# Patient Record
Sex: MALE | Race: WHITE | NOT HISPANIC OR LATINO | Employment: FULL TIME | URBAN - METROPOLITAN AREA
[De-identification: names, ages, dates, MRNs, and addresses within clinical notes are randomized per-mention and may not be internally consistent; named-entity substitution may affect disease eponyms.]

---

## 2017-05-21 ENCOUNTER — VIRTUAL VISIT (OUTPATIENT)
Dept: FAMILY MEDICINE | Facility: OTHER | Age: 52
End: 2017-05-21

## 2017-05-21 NOTE — PROGRESS NOTES
"Date:   Clinician: Angelica Langley  Clinician NPI: [id number]  Patient: Simone Dill  Patient : 1965  Patient Address: [address] [place] [address]  Patient Phone: [phone]  Visit Protocol: URI  Patient Summary:  Simone is a 51 year old ( : 1965 ) male who initiated a Visit for a presumed sinus infection. When asked the question \"Please sign me up to receive news, health information and promotions. \", Simone responded \"No\".     His  symptoms started gradually 10-13 days ago  and consist of rhinitis, hoarse voice, nasal congestion, post-nasal drainage, malaise, chills, loss of appetite, itchy eyes, myalgias, and ear pain.   He denies fever, dysphagia, cough, chest pain, sore throat, dyspnea, nausea, and vomiting. He denies a history of facial surgery.   His moderate nasal secretions are yellow. His moderate facial pain or pressure feels worse when bending over or leaning forward and is located on both sides of his head. The facial pain or pressure started after the onset of other URI symptoms.  Simone has a moderate headache. The headache did not start before his other symptoms and is located on both sides of his head.   In the past year Simone has been diagnosed with two (2) episodes of sinusitis. When asked to feel his neck he could not tell if lymph nodes were enlarged. He denies axillary lymphadenopathy.   Regarding the ear pain, the patient denies tinnitus, recent injury to the area around the ear, and experiencing pain when gently pulling on the earlobe.   He reports having moderate ear pain on the ear canal area of the right ear for 5-7 days. The ear pain has caused a slight decrease in hearing.   In addition to the ear pain, Simone also reports having the following symptoms:    A feeling of fullness in the ear as if it is clogged   Simone denies having swelling, tenderness, and redness on his ear. Simone has thick, yellow fluid leaking from the ear. The ear pain " began before the fluid started leaking and the pain stayed the same once fluid began leaking from the ear. The fluid has a bad oder.   Additionally, he finds the pain worsens if the mouth is open fully or the teeth are clenched, does not experience pain when bending the chin to the chest, and finds the pain is worse while eating or chewing.   He has had tympanostomy tube placement. But it is no longer in place.    He has passed urine in the past 12 hours. He denies rigors.   Simone denies having COPD or other chronic lung disease.   Pulse: self-reported pulse rate as: 12 beats in 10 seconds.    Weight (in lbs): 215   Simone does not smoke or use smokeless tobacco.  MEDICATIONS:  Mometasone (Nasonex), phenylephrine (Sudafed PE), diphenhydramine (Benadryl), phenylephrine (Arthur-Synephrine), ibuprofen (Advil, Motrin), fexofenadine (Allegra), and rosuvastatin (Crestor)  , ALLERGIES:    Patient free text response:   None    Clinician Response:  Dear Simone,  Based on the information you have provided, I am unable to diagnose and treat you without additional information. Please be seen in a clinic or urgent care to determine the treatment that is best for you. You will not be charged for this Visit. Thanks for using [employer].   Diagnosis: Unable to diagnose  Diagnosis ICD: R69  Additional Clinician Notes: You need to have your ear assessed for adequate treatment of this as well as you sinus issues. We cannot treat you through zipnosis and you will not be charged for this visit

## 2018-09-13 RX ORDER — ATENOLOL AND CHLORTHALIDONE TABLET 50; 25 MG/1; MG/1
1 TABLET ORAL DAILY
COMMUNITY

## 2018-09-13 RX ORDER — FEXOFENADINE HCL AND PSEUDOEPHEDRINE HCI 60; 120 MG/1; MG/1
1 TABLET, EXTENDED RELEASE ORAL DAILY
COMMUNITY

## 2018-09-18 ENCOUNTER — APPOINTMENT (OUTPATIENT)
Dept: GENERAL RADIOLOGY | Facility: CLINIC | Age: 53
DRG: 518 | End: 2018-09-18
Attending: ORTHOPAEDIC SURGERY
Payer: COMMERCIAL

## 2018-09-18 ENCOUNTER — HOSPITAL ENCOUNTER (INPATIENT)
Facility: CLINIC | Age: 53
LOS: 2 days | Discharge: HOME | DRG: 518 | End: 2018-09-20
Attending: ORTHOPAEDIC SURGERY | Admitting: ORTHOPAEDIC SURGERY
Payer: COMMERCIAL

## 2018-09-18 ENCOUNTER — ANESTHESIA (OUTPATIENT)
Dept: SURGERY | Facility: CLINIC | Age: 53
DRG: 518 | End: 2018-09-18
Payer: COMMERCIAL

## 2018-09-18 ENCOUNTER — ANESTHESIA EVENT (OUTPATIENT)
Dept: SURGERY | Facility: CLINIC | Age: 53
DRG: 518 | End: 2018-09-18
Payer: COMMERCIAL

## 2018-09-18 DIAGNOSIS — G95.9 CERVICAL MYELOPATHY (H): Primary | ICD-10-CM

## 2018-09-18 LAB
ABO + RH BLD: NORMAL
ABO + RH BLD: NORMAL
BLD GP AB SCN SERPL QL: NORMAL
BLOOD BANK CMNT PATIENT-IMP: NORMAL
CREAT SERPL-MCNC: 1.03 MG/DL (ref 0.66–1.25)
GFR SERPL CREATININE-BSD FRML MDRD: 76 ML/MIN/1.7M2
POTASSIUM SERPL-SCNC: 3.3 MMOL/L (ref 3.4–5.3)
SPECIMEN EXP DATE BLD: NORMAL

## 2018-09-18 PROCEDURE — 37000009 ZZH ANESTHESIA TECHNICAL FEE, EACH ADDTL 15 MIN: Performed by: ORTHOPAEDIC SURGERY

## 2018-09-18 PROCEDURE — 25000132 ZZH RX MED GY IP 250 OP 250 PS 637: Performed by: ANESTHESIOLOGY

## 2018-09-18 PROCEDURE — 40000985 XR CERVICAL SPINE PORT 1 VW

## 2018-09-18 PROCEDURE — 71000012 ZZH RECOVERY PHASE 1 LEVEL 1 FIRST HR: Performed by: ORTHOPAEDIC SURGERY

## 2018-09-18 PROCEDURE — 99207 ZZC NON-BILLABLE SERV PER CHARTING: CPT | Performed by: PHYSICIAN ASSISTANT

## 2018-09-18 PROCEDURE — 71000013 ZZH RECOVERY PHASE 1 LEVEL 1 EA ADDTL HR: Performed by: ORTHOPAEDIC SURGERY

## 2018-09-18 PROCEDURE — 0RR30JZ REPLACEMENT OF CERVICAL VERTEBRAL DISC WITH SYNTHETIC SUBSTITUTE, OPEN APPROACH: ICD-10-PCS | Performed by: ORTHOPAEDIC SURGERY

## 2018-09-18 PROCEDURE — 40000169 ZZH STATISTIC PRE-PROCEDURE ASSESSMENT I: Performed by: ORTHOPAEDIC SURGERY

## 2018-09-18 PROCEDURE — 25000128 H RX IP 250 OP 636: Performed by: PHYSICIAN ASSISTANT

## 2018-09-18 PROCEDURE — 37000008 ZZH ANESTHESIA TECHNICAL FEE, 1ST 30 MIN: Performed by: ORTHOPAEDIC SURGERY

## 2018-09-18 PROCEDURE — 00NW0ZZ RELEASE CERVICAL SPINAL CORD, OPEN APPROACH: ICD-10-PCS | Performed by: ORTHOPAEDIC SURGERY

## 2018-09-18 PROCEDURE — 86850 RBC ANTIBODY SCREEN: CPT | Performed by: ORTHOPAEDIC SURGERY

## 2018-09-18 PROCEDURE — 25000125 ZZHC RX 250: Performed by: ORTHOPAEDIC SURGERY

## 2018-09-18 PROCEDURE — L8699 PROSTHETIC IMPLANT NOS: HCPCS | Performed by: ORTHOPAEDIC SURGERY

## 2018-09-18 PROCEDURE — 25000128 H RX IP 250 OP 636: Performed by: ANESTHESIOLOGY

## 2018-09-18 PROCEDURE — 25000128 H RX IP 250 OP 636: Performed by: ORTHOPAEDIC SURGERY

## 2018-09-18 PROCEDURE — 25000132 ZZH RX MED GY IP 250 OP 250 PS 637: Performed by: ORTHOPAEDIC SURGERY

## 2018-09-18 PROCEDURE — 4A11X4G MONITORING OF PERIPHERAL NERVOUS ELECTRICAL ACTIVITY, INTRAOPERATIVE, EXTERNAL APPROACH: ICD-10-PCS | Performed by: ORTHOPAEDIC SURGERY

## 2018-09-18 PROCEDURE — 86900 BLOOD TYPING SEROLOGIC ABO: CPT | Performed by: ORTHOPAEDIC SURGERY

## 2018-09-18 PROCEDURE — 27210794 ZZH OR GENERAL SUPPLY STERILE: Performed by: ORTHOPAEDIC SURGERY

## 2018-09-18 PROCEDURE — 95940 IONM IN OPERATNG ROOM 15 MIN: CPT | Performed by: ORTHOPAEDIC SURGERY

## 2018-09-18 PROCEDURE — 36000069 ZZH SURGERY LEVEL 5 EA 15 ADDTL MIN: Performed by: ORTHOPAEDIC SURGERY

## 2018-09-18 PROCEDURE — 40000986 XR CERVICAL SPINE PORT 2/3 VW

## 2018-09-18 PROCEDURE — 12000000 ZZH R&B MED SURG/OB

## 2018-09-18 PROCEDURE — 25000566 ZZH SEVOFLURANE, EA 15 MIN: Performed by: ORTHOPAEDIC SURGERY

## 2018-09-18 PROCEDURE — 36000067 ZZH SURGERY LEVEL 5 1ST 30 MIN: Performed by: ORTHOPAEDIC SURGERY

## 2018-09-18 PROCEDURE — 82565 ASSAY OF CREATININE: CPT | Performed by: ANESTHESIOLOGY

## 2018-09-18 PROCEDURE — 86901 BLOOD TYPING SEROLOGIC RH(D): CPT | Performed by: ORTHOPAEDIC SURGERY

## 2018-09-18 PROCEDURE — C1713 ANCHOR/SCREW BN/BN,TIS/BN: HCPCS | Performed by: ORTHOPAEDIC SURGERY

## 2018-09-18 PROCEDURE — 84132 ASSAY OF SERUM POTASSIUM: CPT | Performed by: ANESTHESIOLOGY

## 2018-09-18 PROCEDURE — 0RT30ZZ RESECTION OF CERVICAL VERTEBRAL DISC, OPEN APPROACH: ICD-10-PCS | Performed by: ORTHOPAEDIC SURGERY

## 2018-09-18 PROCEDURE — 40000277 XR SURGERY CARM FLUORO LESS THAN 5 MIN W STILLS

## 2018-09-18 DEVICE — IMP SCR SYN CORTEX RECESS 2.0X06MM ST TI 401.136.99: Type: IMPLANTABLE DEVICE | Site: SPINE CERVICAL | Status: FUNCTIONAL

## 2018-09-18 DEVICE — IMPLANTABLE DEVICE: Type: IMPLANTABLE DEVICE | Site: SPINE CERVICAL | Status: FUNCTIONAL

## 2018-09-18 DEVICE — IMP PLATE SYN ADAPTION 2.0X100MM 20H TI 447.10: Type: IMPLANTABLE DEVICE | Site: SPINE CERVICAL | Status: FUNCTIONAL

## 2018-09-18 RX ORDER — CEFAZOLIN SODIUM 2 G/100ML
2 INJECTION, SOLUTION INTRAVENOUS
Status: COMPLETED | OUTPATIENT
Start: 2018-09-18 | End: 2018-09-18

## 2018-09-18 RX ORDER — HYDROMORPHONE HYDROCHLORIDE 1 MG/ML
.3-.5 INJECTION, SOLUTION INTRAMUSCULAR; INTRAVENOUS; SUBCUTANEOUS
Status: DISCONTINUED | OUTPATIENT
Start: 2018-09-18 | End: 2018-09-20 | Stop reason: HOSPADM

## 2018-09-18 RX ORDER — FENTANYL CITRATE 50 UG/ML
INJECTION, SOLUTION INTRAMUSCULAR; INTRAVENOUS PRN
Status: DISCONTINUED | OUTPATIENT
Start: 2018-09-18 | End: 2018-09-18

## 2018-09-18 RX ORDER — METOCLOPRAMIDE 10 MG/1
10 TABLET ORAL EVERY 6 HOURS PRN
Status: DISCONTINUED | OUTPATIENT
Start: 2018-09-18 | End: 2018-09-20 | Stop reason: HOSPADM

## 2018-09-18 RX ORDER — OXYCODONE HCL 10 MG/1
10 TABLET, FILM COATED, EXTENDED RELEASE ORAL EVERY 12 HOURS
Status: DISCONTINUED | OUTPATIENT
Start: 2018-09-18 | End: 2018-09-18 | Stop reason: HOSPADM

## 2018-09-18 RX ORDER — PROPOFOL 10 MG/ML
INJECTION, EMULSION INTRAVENOUS CONTINUOUS PRN
Status: DISCONTINUED | OUTPATIENT
Start: 2018-09-18 | End: 2018-09-18

## 2018-09-18 RX ORDER — ATENOLOL 50 MG/1
50 TABLET ORAL DAILY
Status: DISCONTINUED | OUTPATIENT
Start: 2018-09-19 | End: 2018-09-20 | Stop reason: HOSPADM

## 2018-09-18 RX ORDER — DIAZEPAM 5 MG
5 TABLET ORAL EVERY 8 HOURS PRN
Status: DISCONTINUED | OUTPATIENT
Start: 2018-09-18 | End: 2018-09-20 | Stop reason: HOSPADM

## 2018-09-18 RX ORDER — EPHEDRINE SULFATE 50 MG/ML
INJECTION, SOLUTION INTRAMUSCULAR; INTRAVENOUS; SUBCUTANEOUS PRN
Status: DISCONTINUED | OUTPATIENT
Start: 2018-09-18 | End: 2018-09-18

## 2018-09-18 RX ORDER — ACETAMINOPHEN 325 MG/1
650 TABLET ORAL EVERY 4 HOURS PRN
Status: DISCONTINUED | OUTPATIENT
Start: 2018-09-21 | End: 2018-09-20 | Stop reason: HOSPADM

## 2018-09-18 RX ORDER — HYDRALAZINE HYDROCHLORIDE 20 MG/ML
2.5-5 INJECTION INTRAMUSCULAR; INTRAVENOUS EVERY 10 MIN PRN
Status: DISCONTINUED | OUTPATIENT
Start: 2018-09-18 | End: 2018-09-18 | Stop reason: HOSPADM

## 2018-09-18 RX ORDER — OXYCODONE HYDROCHLORIDE 5 MG/1
5-10 TABLET ORAL
Status: DISCONTINUED | OUTPATIENT
Start: 2018-09-18 | End: 2018-09-20 | Stop reason: HOSPADM

## 2018-09-18 RX ORDER — MULTIPLE VITAMINS W/ MINERALS TAB 9MG-400MCG
1 TAB ORAL DAILY
COMMUNITY

## 2018-09-18 RX ORDER — NITROGLYCERIN 20 MG/100ML
0.07-2 INJECTION INTRAVENOUS CONTINUOUS
Status: DISCONTINUED | OUTPATIENT
Start: 2018-09-18 | End: 2018-09-18 | Stop reason: HOSPADM

## 2018-09-18 RX ORDER — SODIUM CHLORIDE, SODIUM LACTATE, POTASSIUM CHLORIDE, CALCIUM CHLORIDE 600; 310; 30; 20 MG/100ML; MG/100ML; MG/100ML; MG/100ML
INJECTION, SOLUTION INTRAVENOUS CONTINUOUS PRN
Status: DISCONTINUED | OUTPATIENT
Start: 2018-09-18 | End: 2018-09-18

## 2018-09-18 RX ORDER — ACETAMINOPHEN 500 MG
1000 TABLET ORAL ONCE
Status: COMPLETED | OUTPATIENT
Start: 2018-09-18 | End: 2018-09-18

## 2018-09-18 RX ORDER — ASCORBIC ACID 500 MG
1500 TABLET ORAL DAILY
Status: DISCONTINUED | OUTPATIENT
Start: 2018-09-18 | End: 2018-09-20 | Stop reason: HOSPADM

## 2018-09-18 RX ORDER — NALOXONE HYDROCHLORIDE 0.4 MG/ML
.1-.4 INJECTION, SOLUTION INTRAMUSCULAR; INTRAVENOUS; SUBCUTANEOUS
Status: DISCONTINUED | OUTPATIENT
Start: 2018-09-18 | End: 2018-09-18 | Stop reason: HOSPADM

## 2018-09-18 RX ORDER — ATENOLOL AND CHLORTHALIDONE TABLET 50; 25 MG/1; MG/1
1 TABLET ORAL DAILY
Status: DISCONTINUED | OUTPATIENT
Start: 2018-09-19 | End: 2018-09-18

## 2018-09-18 RX ORDER — ONDANSETRON 2 MG/ML
4 INJECTION INTRAMUSCULAR; INTRAVENOUS EVERY 30 MIN PRN
Status: DISCONTINUED | OUTPATIENT
Start: 2018-09-18 | End: 2018-09-18 | Stop reason: HOSPADM

## 2018-09-18 RX ORDER — ONDANSETRON 4 MG/1
4 TABLET, ORALLY DISINTEGRATING ORAL EVERY 30 MIN PRN
Status: DISCONTINUED | OUTPATIENT
Start: 2018-09-18 | End: 2018-09-18 | Stop reason: HOSPADM

## 2018-09-18 RX ORDER — PRAMIPEXOLE DIHYDROCHLORIDE 0.25 MG/1
0.25 TABLET ORAL AT BEDTIME
Status: DISCONTINUED | OUTPATIENT
Start: 2018-09-18 | End: 2018-09-20 | Stop reason: HOSPADM

## 2018-09-18 RX ORDER — ONDANSETRON 2 MG/ML
INJECTION INTRAMUSCULAR; INTRAVENOUS PRN
Status: DISCONTINUED | OUTPATIENT
Start: 2018-09-18 | End: 2018-09-18

## 2018-09-18 RX ORDER — TESTOSTERONE CYPIONATE 200 MG/ML
50 INJECTION, SOLUTION INTRAMUSCULAR
COMMUNITY

## 2018-09-18 RX ORDER — INDOMETHACIN 50 MG/1
50 CAPSULE ORAL
Status: DISCONTINUED | OUTPATIENT
Start: 2018-09-18 | End: 2018-09-20 | Stop reason: HOSPADM

## 2018-09-18 RX ORDER — AMOXICILLIN 250 MG
2 CAPSULE ORAL 2 TIMES DAILY
Status: DISCONTINUED | OUTPATIENT
Start: 2018-09-18 | End: 2018-09-20 | Stop reason: HOSPADM

## 2018-09-18 RX ORDER — SODIUM CHLORIDE, SODIUM LACTATE, POTASSIUM CHLORIDE, CALCIUM CHLORIDE 600; 310; 30; 20 MG/100ML; MG/100ML; MG/100ML; MG/100ML
INJECTION, SOLUTION INTRAVENOUS CONTINUOUS
Status: DISCONTINUED | OUTPATIENT
Start: 2018-09-18 | End: 2018-09-18 | Stop reason: HOSPADM

## 2018-09-18 RX ORDER — NALOXONE HYDROCHLORIDE 0.4 MG/ML
.1-.4 INJECTION, SOLUTION INTRAMUSCULAR; INTRAVENOUS; SUBCUTANEOUS
Status: DISCONTINUED | OUTPATIENT
Start: 2018-09-18 | End: 2018-09-20 | Stop reason: HOSPADM

## 2018-09-18 RX ORDER — BISACODYL 10 MG
10 SUPPOSITORY, RECTAL RECTAL DAILY PRN
Status: DISCONTINUED | OUTPATIENT
Start: 2018-09-18 | End: 2018-09-20 | Stop reason: HOSPADM

## 2018-09-18 RX ORDER — ONDANSETRON 4 MG/1
4 TABLET, ORALLY DISINTEGRATING ORAL EVERY 6 HOURS PRN
Status: DISCONTINUED | OUTPATIENT
Start: 2018-09-18 | End: 2018-09-20 | Stop reason: HOSPADM

## 2018-09-18 RX ORDER — LIDOCAINE 40 MG/G
CREAM TOPICAL
Status: DISCONTINUED | OUTPATIENT
Start: 2018-09-18 | End: 2018-09-20 | Stop reason: HOSPADM

## 2018-09-18 RX ORDER — SODIUM CHLORIDE AND POTASSIUM CHLORIDE 150; 900 MG/100ML; MG/100ML
INJECTION, SOLUTION INTRAVENOUS CONTINUOUS
Status: DISCONTINUED | OUTPATIENT
Start: 2018-09-18 | End: 2018-09-20 | Stop reason: HOSPADM

## 2018-09-18 RX ORDER — HYDRALAZINE HYDROCHLORIDE 20 MG/ML
10 INJECTION INTRAMUSCULAR; INTRAVENOUS EVERY 4 HOURS PRN
Status: DISCONTINUED | OUTPATIENT
Start: 2018-09-18 | End: 2018-09-20 | Stop reason: HOSPADM

## 2018-09-18 RX ORDER — BUPIVACAINE HYDROCHLORIDE AND EPINEPHRINE 2.5; 5 MG/ML; UG/ML
INJECTION, SOLUTION INFILTRATION; PERINEURAL PRN
Status: DISCONTINUED | OUTPATIENT
Start: 2018-09-18 | End: 2018-09-18 | Stop reason: HOSPADM

## 2018-09-18 RX ORDER — MOMETASONE FUROATE MONOHYDRATE 50 UG/1
2 SPRAY, METERED NASAL AT BEDTIME
COMMUNITY

## 2018-09-18 RX ORDER — LIDOCAINE HYDROCHLORIDE 20 MG/ML
INJECTION, SOLUTION INFILTRATION; PERINEURAL PRN
Status: DISCONTINUED | OUTPATIENT
Start: 2018-09-18 | End: 2018-09-18

## 2018-09-18 RX ORDER — AMOXICILLIN 250 MG
1 CAPSULE ORAL 2 TIMES DAILY
Status: DISCONTINUED | OUTPATIENT
Start: 2018-09-18 | End: 2018-09-20 | Stop reason: HOSPADM

## 2018-09-18 RX ORDER — FEXOFENADINE HCL AND PSEUDOEPHEDRINE HCI 60; 120 MG/1; MG/1
1 TABLET, EXTENDED RELEASE ORAL DAILY
Status: DISCONTINUED | OUTPATIENT
Start: 2018-09-18 | End: 2018-09-20 | Stop reason: HOSPADM

## 2018-09-18 RX ORDER — HYDROMORPHONE HYDROCHLORIDE 1 MG/ML
.3-.5 INJECTION, SOLUTION INTRAMUSCULAR; INTRAVENOUS; SUBCUTANEOUS EVERY 5 MIN PRN
Status: DISCONTINUED | OUTPATIENT
Start: 2018-09-18 | End: 2018-09-18 | Stop reason: HOSPADM

## 2018-09-18 RX ORDER — GABAPENTIN 300 MG/1
300 CAPSULE ORAL 3 TIMES DAILY
Status: DISCONTINUED | OUTPATIENT
Start: 2018-09-18 | End: 2018-09-20 | Stop reason: HOSPADM

## 2018-09-18 RX ORDER — POVIDONE-IODINE 10 MG/G
OINTMENT TOPICAL PRN
Status: DISCONTINUED | OUTPATIENT
Start: 2018-09-18 | End: 2018-09-18 | Stop reason: HOSPADM

## 2018-09-18 RX ORDER — UBIDECARENONE 75 MG
100 CAPSULE ORAL DAILY
Status: DISCONTINUED | OUTPATIENT
Start: 2018-09-19 | End: 2018-09-20 | Stop reason: HOSPADM

## 2018-09-18 RX ORDER — METOCLOPRAMIDE HYDROCHLORIDE 5 MG/ML
10 INJECTION INTRAMUSCULAR; INTRAVENOUS EVERY 6 HOURS PRN
Status: DISCONTINUED | OUTPATIENT
Start: 2018-09-18 | End: 2018-09-20 | Stop reason: HOSPADM

## 2018-09-18 RX ORDER — ROSUVASTATIN CALCIUM 20 MG/1
40 TABLET, COATED ORAL AT BEDTIME
Status: DISCONTINUED | OUTPATIENT
Start: 2018-09-18 | End: 2018-09-20 | Stop reason: HOSPADM

## 2018-09-18 RX ORDER — ONDANSETRON 2 MG/ML
4 INJECTION INTRAMUSCULAR; INTRAVENOUS EVERY 6 HOURS PRN
Status: DISCONTINUED | OUTPATIENT
Start: 2018-09-18 | End: 2018-09-20 | Stop reason: HOSPADM

## 2018-09-18 RX ORDER — LABETALOL HYDROCHLORIDE 5 MG/ML
10 INJECTION, SOLUTION INTRAVENOUS EVERY 4 HOURS PRN
Status: DISCONTINUED | OUTPATIENT
Start: 2018-09-18 | End: 2018-09-20 | Stop reason: HOSPADM

## 2018-09-18 RX ORDER — CHLORTHALIDONE 25 MG/1
25 TABLET ORAL DAILY
Status: DISCONTINUED | OUTPATIENT
Start: 2018-09-19 | End: 2018-09-20 | Stop reason: HOSPADM

## 2018-09-18 RX ORDER — FENTANYL CITRATE 50 UG/ML
25-50 INJECTION, SOLUTION INTRAMUSCULAR; INTRAVENOUS
Status: DISCONTINUED | OUTPATIENT
Start: 2018-09-18 | End: 2018-09-18 | Stop reason: HOSPADM

## 2018-09-18 RX ORDER — ACETAMINOPHEN 325 MG/1
975 TABLET ORAL EVERY 8 HOURS
Status: DISCONTINUED | OUTPATIENT
Start: 2018-09-18 | End: 2018-09-20 | Stop reason: HOSPADM

## 2018-09-18 RX ORDER — CEFAZOLIN SODIUM 1 G/3ML
1 INJECTION, POWDER, FOR SOLUTION INTRAMUSCULAR; INTRAVENOUS EVERY 8 HOURS
Status: COMPLETED | OUTPATIENT
Start: 2018-09-18 | End: 2018-09-19

## 2018-09-18 RX ORDER — CEFAZOLIN SODIUM 1 G/3ML
1 INJECTION, POWDER, FOR SOLUTION INTRAMUSCULAR; INTRAVENOUS SEE ADMIN INSTRUCTIONS
Status: DISCONTINUED | OUTPATIENT
Start: 2018-09-18 | End: 2018-09-18 | Stop reason: HOSPADM

## 2018-09-18 RX ORDER — OXYCODONE HCL 10 MG/1
10 TABLET, FILM COATED, EXTENDED RELEASE ORAL EVERY 12 HOURS
Status: COMPLETED | OUTPATIENT
Start: 2018-09-18 | End: 2018-09-20

## 2018-09-18 RX ORDER — PROPOFOL 10 MG/ML
INJECTION, EMULSION INTRAVENOUS PRN
Status: DISCONTINUED | OUTPATIENT
Start: 2018-09-18 | End: 2018-09-18

## 2018-09-18 RX ORDER — ESCITALOPRAM OXALATE 10 MG/1
10 TABLET ORAL DAILY
Status: DISCONTINUED | OUTPATIENT
Start: 2018-09-19 | End: 2018-09-20 | Stop reason: HOSPADM

## 2018-09-18 RX ADMIN — HYDRALAZINE HYDROCHLORIDE 10 MG: 20 INJECTION INTRAMUSCULAR; INTRAVENOUS at 19:52

## 2018-09-18 RX ADMIN — PROPOFOL 200 MG: 10 INJECTION, EMULSION INTRAVENOUS at 08:17

## 2018-09-18 RX ADMIN — EPHEDRINE SULFATE 5 MG: 50 INJECTION, SOLUTION INTRAMUSCULAR; INTRAVENOUS; SUBCUTANEOUS at 09:02

## 2018-09-18 RX ADMIN — OXYCODONE HYDROCHLORIDE 5 MG: 5 TABLET ORAL at 18:35

## 2018-09-18 RX ADMIN — SENNOSIDES AND DOCUSATE SODIUM 2 TABLET: 8.6; 5 TABLET ORAL at 20:21

## 2018-09-18 RX ADMIN — FENTANYL CITRATE 50 MCG: 50 INJECTION, SOLUTION INTRAMUSCULAR; INTRAVENOUS at 10:58

## 2018-09-18 RX ADMIN — EPHEDRINE SULFATE 7.5 MG: 50 INJECTION, SOLUTION INTRAMUSCULAR; INTRAVENOUS; SUBCUTANEOUS at 08:32

## 2018-09-18 RX ADMIN — PROPOFOL 100 MCG/KG/MIN: 10 INJECTION, EMULSION INTRAVENOUS at 08:20

## 2018-09-18 RX ADMIN — Medication 0.5 MG: at 16:15

## 2018-09-18 RX ADMIN — SODIUM CHLORIDE, SODIUM LACTATE, POTASSIUM CHLORIDE, CALCIUM CHLORIDE: 600; 310; 30; 20 INJECTION, SOLUTION INTRAVENOUS at 08:00

## 2018-09-18 RX ADMIN — FENTANYL CITRATE 50 MCG: 50 INJECTION INTRAMUSCULAR; INTRAVENOUS at 16:00

## 2018-09-18 RX ADMIN — LIDOCAINE HYDROCHLORIDE 100 MG: 20 INJECTION, SOLUTION INFILTRATION; PERINEURAL at 08:17

## 2018-09-18 RX ADMIN — EPHEDRINE SULFATE 5 MG: 50 INJECTION, SOLUTION INTRAMUSCULAR; INTRAVENOUS; SUBCUTANEOUS at 11:00

## 2018-09-18 RX ADMIN — FENTANYL CITRATE 50 MCG: 50 INJECTION INTRAMUSCULAR; INTRAVENOUS at 16:55

## 2018-09-18 RX ADMIN — FENTANYL CITRATE 50 MCG: 50 INJECTION, SOLUTION INTRAMUSCULAR; INTRAVENOUS at 15:19

## 2018-09-18 RX ADMIN — SODIUM CHLORIDE, POTASSIUM CHLORIDE, SODIUM LACTATE AND CALCIUM CHLORIDE: 600; 310; 30; 20 INJECTION, SOLUTION INTRAVENOUS at 07:29

## 2018-09-18 RX ADMIN — OXYCODONE HYDROCHLORIDE 10 MG: 10 TABLET, FILM COATED, EXTENDED RELEASE ORAL at 19:38

## 2018-09-18 RX ADMIN — SODIUM CHLORIDE, SODIUM LACTATE, POTASSIUM CHLORIDE, CALCIUM CHLORIDE: 600; 310; 30; 20 INJECTION, SOLUTION INTRAVENOUS at 11:10

## 2018-09-18 RX ADMIN — FEXOFENADINE HYDROCHLORIDE AND PSEUDOEPHEDRINE HYDROCHLORIDE 1 TABLET: 60; 120 TABLET, FILM COATED, EXTENDED RELEASE ORAL at 19:37

## 2018-09-18 RX ADMIN — SODIUM CHLORIDE, SODIUM LACTATE, POTASSIUM CHLORIDE, CALCIUM CHLORIDE: 600; 310; 30; 20 INJECTION, SOLUTION INTRAVENOUS at 09:47

## 2018-09-18 RX ADMIN — OXYCODONE HYDROCHLORIDE AND ACETAMINOPHEN 1500 MG: 500 TABLET ORAL at 19:37

## 2018-09-18 RX ADMIN — Medication 0.5 MG: at 16:24

## 2018-09-18 RX ADMIN — FENTANYL CITRATE 50 MCG: 50 INJECTION, SOLUTION INTRAMUSCULAR; INTRAVENOUS at 15:03

## 2018-09-18 RX ADMIN — HYDRALAZINE HYDROCHLORIDE 2.5 MG: 20 INJECTION INTRAMUSCULAR; INTRAVENOUS at 16:36

## 2018-09-18 RX ADMIN — FENTANYL CITRATE 50 MCG: 50 INJECTION, SOLUTION INTRAMUSCULAR; INTRAVENOUS at 14:58

## 2018-09-18 RX ADMIN — Medication 20 MG: at 09:33

## 2018-09-18 RX ADMIN — FENTANYL CITRATE 50 MCG: 50 INJECTION, SOLUTION INTRAMUSCULAR; INTRAVENOUS at 15:33

## 2018-09-18 RX ADMIN — FENTANYL CITRATE 50 MCG: 50 INJECTION, SOLUTION INTRAMUSCULAR; INTRAVENOUS at 15:28

## 2018-09-18 RX ADMIN — ACETAMINOPHEN 975 MG: 325 TABLET, FILM COATED ORAL at 19:37

## 2018-09-18 RX ADMIN — ACETAMINOPHEN 1000 MG: 500 TABLET, FILM COATED ORAL at 07:28

## 2018-09-18 RX ADMIN — Medication 20 MG: at 09:24

## 2018-09-18 RX ADMIN — INDOMETHACIN 50 MG: 50 CAPSULE ORAL at 19:38

## 2018-09-18 RX ADMIN — FENTANYL CITRATE 50 MCG: 50 INJECTION INTRAMUSCULAR; INTRAVENOUS at 16:50

## 2018-09-18 RX ADMIN — SODIUM CHLORIDE AND POTASSIUM CHLORIDE: 9; 1.49 INJECTION, SOLUTION INTRAVENOUS at 18:35

## 2018-09-18 RX ADMIN — PRAMIPEXOLE DIHYDROCHLORIDE 0.25 MG: 0.25 TABLET ORAL at 21:50

## 2018-09-18 RX ADMIN — ROSUVASTATIN CALCIUM 40 MG: 20 TABLET, FILM COATED ORAL at 21:50

## 2018-09-18 RX ADMIN — EPHEDRINE SULFATE 5 MG: 50 INJECTION, SOLUTION INTRAMUSCULAR; INTRAVENOUS; SUBCUTANEOUS at 08:33

## 2018-09-18 RX ADMIN — FENTANYL CITRATE 50 MCG: 50 INJECTION, SOLUTION INTRAMUSCULAR; INTRAVENOUS at 09:34

## 2018-09-18 RX ADMIN — CEFAZOLIN SODIUM 2 G: 2 INJECTION, SOLUTION INTRAVENOUS at 09:00

## 2018-09-18 RX ADMIN — Medication 0.5 MG: at 16:51

## 2018-09-18 RX ADMIN — FENTANYL CITRATE 50 MCG: 50 INJECTION INTRAMUSCULAR; INTRAVENOUS at 16:07

## 2018-09-18 RX ADMIN — Medication 0.5 MG: at 16:30

## 2018-09-18 RX ADMIN — Medication 1 TABLET: at 20:21

## 2018-09-18 RX ADMIN — OXYCODONE HYDROCHLORIDE 10 MG: 10 TABLET, FILM COATED, EXTENDED RELEASE ORAL at 07:28

## 2018-09-18 RX ADMIN — FENTANYL CITRATE 100 MCG: 50 INJECTION, SOLUTION INTRAMUSCULAR; INTRAVENOUS at 08:15

## 2018-09-18 RX ADMIN — OXYCODONE HYDROCHLORIDE 10 MG: 5 TABLET ORAL at 22:44

## 2018-09-18 RX ADMIN — ONDANSETRON 4 MG: 2 INJECTION INTRAMUSCULAR; INTRAVENOUS at 14:39

## 2018-09-18 RX ADMIN — DIAZEPAM 5 MG: 5 TABLET ORAL at 20:38

## 2018-09-18 RX ADMIN — HYDRALAZINE HYDROCHLORIDE 2.5 MG: 20 INJECTION INTRAMUSCULAR; INTRAVENOUS at 16:54

## 2018-09-18 RX ADMIN — PROPOFOL 50 MG: 10 INJECTION, EMULSION INTRAVENOUS at 11:20

## 2018-09-18 RX ADMIN — EPHEDRINE SULFATE 5 MG: 50 INJECTION, SOLUTION INTRAMUSCULAR; INTRAVENOUS; SUBCUTANEOUS at 09:15

## 2018-09-18 RX ADMIN — SODIUM CHLORIDE, SODIUM LACTATE, POTASSIUM CHLORIDE, CALCIUM CHLORIDE: 600; 310; 30; 20 INJECTION, SOLUTION INTRAVENOUS at 08:25

## 2018-09-18 RX ADMIN — Medication 0.5 MG: at 14:59

## 2018-09-18 RX ADMIN — CEFAZOLIN SODIUM 2 G: 2 INJECTION, SOLUTION INTRAVENOUS at 13:00

## 2018-09-18 RX ADMIN — CEFAZOLIN 1 G: 1 INJECTION, POWDER, FOR SOLUTION INTRAMUSCULAR; INTRAVENOUS at 20:21

## 2018-09-18 RX ADMIN — GABAPENTIN 300 MG: 300 CAPSULE ORAL at 21:50

## 2018-09-18 RX ADMIN — SODIUM CHLORIDE, SODIUM LACTATE, POTASSIUM CHLORIDE, CALCIUM CHLORIDE: 600; 310; 30; 20 INJECTION, SOLUTION INTRAVENOUS at 09:25

## 2018-09-18 ASSESSMENT — VISUAL ACUITY
OU: NORMAL ACUITY

## 2018-09-18 ASSESSMENT — ACTIVITIES OF DAILY LIVING (ADL): ADLS_ACUITY_SCORE: 9

## 2018-09-18 NOTE — BRIEF OP NOTE
[facility] Brief Operative Note    Pre-operative diagnosis: CERVICAL SPINAL STENOSIS WITH MYELOPATHY, CENTRAL DISK EXTRUSION AT C5-6   Post-operative diagnosis same   Procedure: Procedure(s):  C5-6 ANTERIOR CERVICAL DISCECTOMY AND TOTAL DISK REPLACEMENT USING THE JIMMY DISK, POSTERIOR C3-6 LAMINOPLASTY WITH INSTRUMENTATION, FORAMINOTOMY C5 AND C6 ON RIGHT SIDE - Wound Class: I-Clean   - Wound Class: I-Clean   - Wound Class: I-Clean   Surgeon(s): Surgeon(s) and Role:  Panel 1:     * Carlos Jenkins MD - Primary     * Rowena Zamorano PA-C - Assisting    Panel 2:     * Carlos Jenkins MD - Primary     * Rowena Zamorano PA-C - Assisting   Estimated blood loss: 100 mL    Specimens: * No specimens in log *   Findings: Central disc herniation at C5-6, central stenosis C4 to C6, foraminal stenosis on the right at C5 and C6.

## 2018-09-18 NOTE — IP AVS SNAPSHOT
MRN:[id number]                      After Visit Summary   9/18/2018    Simone Dill    MRN: [id number]           Thank you!     Thank you for choosing [facility] for your care. Our goal is always to provide you with excellent care. Hearing back from our patients is one way we can continue to improve our services. Please take a few minutes to complete the written survey that you may receive in the mail after you visit with us. Thank you!        Patient Information     Date Of Birth          1965        Designated Caregiver       Most Recent Value    Caregiver    Will someone help with your care after discharge? yes    Name of designated caregiver Yaz    Phone number of caregiver [phone]    Caregiver address [address] [place] [address]      About your hospital stay     You were admitted on:  September 18, 2018 You last received care in the:  [facility] [address] [facility] [place]    You were discharged on:  September 20, 2018       Who to Call     For medical emergencies, please call 911.  For non-urgent questions about your medical care, please call your primary care provider or clinic, [phone]  For questions related to your surgery, please call your surgery clinic        Attending Provider     Provider Specialty    Carlos Jenkins MD Orthopedics       Primary Care Provider Office Phone # Fax #    Javier Leonard [phone]      After Care Instructions     Activity       Your activity upon discharge: activity as tolerated            Diet       Follow this diet upon discharge: Orders Placed This Encounter      Advance Diet as Tolerated: Regular Diet Adult; Dysphagia Diet Level 3: Advanced; Thin Liquids (water, ice chips, juice, milk gelatin, ice cream, etc)            Wound care and dressings       Instructions to care for your wound at home: as directed, ice to area for comfort and keep wound clean and dry.                   Follow-up Appointments     Follow-up and recommended labs and tests        Follow up with Dr. Sheridan , at (location with clinic name or city) [facility], within 10 days to evaluate after surgery. No follow up labs or test are needed.                  Further instructions from your care team       INSTRUCTIONS FOR CERVICAL SURGICAL PATIENTS  ROSS SHERIDAN MD--[facility]    POSTOPERATIVE INSTRUCTIONS (AFTER SURGERY):     PATIENTS WITH A SOFT COLLAR:    1. The collar must be worn at all times for the 6 weeks after your surgery until your first follow up appointment with Dr. Sheridan. You may only remove it to shower however, be sure to avoid any excessive movement of your head and neck.      PATIENTS WITH A CUSTOM NECK BRACE:  1. The brace must be worn at all times for 3 months after surgery. In most cases you can remove it to shower. Dr. Sheridan will tell you if you cannot.     2. Check the straps on the brace daily to make sure the lines drawn on the straps are where they belong and the brace is fitting properly.    3. Regarding any questions, concerns or needed adjustments with the brace please call [facility], [phone], and speak directly to the orthotist that fit the brace. If you are having difficulty reaching them please call [facility] at [phone].    4. This brace can be cumbersome to wear we encourage you to practice wearing it with all activities BEFORE your surgery. This will allow you to get used to how it will feel and make any modifications at home or work if need be.     INCISION/WOUND CARE                1.   If you are discharged from the hospital with a dressing over your incision you may remove and replace it 2 days after leaving the hospital.     2.   If you have sutures that dissolve, the incision must be covered when showering for 5 days after surgery. On the 6th day, you may take a shower normally without covering the wound. But do not scrub the  wound.     3.  The small pieces of tape over your incision are called Steri-strips, they can be   removed when they are loose or after 2 weeks.                                                                           4.    If you have staples, your incision must remain dry and covered until they are removed 2 weeks after your surgery. Please call Dr. Gregory Barron s Care Coordinator at [phone] to make an appointment to have these removed when you have returned home from the hospital.    5. Please look at your incisions daily and call [phone] immediately if you notice any redness, swelling, drainage, or if you develop a fever greater than 101. If after hours or weekends call [phone] and speak to the on-call physician.    6. Absolutely no bathtubs, hot tubs, swimming in pools or lakes, or any submersion/soaking before the incision is completely healed (no open areas or scabs are present).    7. Due to the location of the incision and surgery you may experience swelling that can alter your swallowing if you are having any difficulty at all please contact us immediately [phone] or after hours or weekends call [phone] and speak to the on-call physician.    POST OPERATIVE ACTIVITY LIMITATIONS    1. Avoid extreme motions of your head and neck which will be limited/controlled by the collar. Prior to surgery we encourage you to stock straws for drinking and soft foods to eat post-operatively as eating/drinking will be temporarily effected by both post surgical swelling and the collar.     2. No lifting over 10 pounds (gallon of milk) above your chest or below your waist.     3. Avoid repetitive twisting or bending i.e. raking, sweeping, shoveling etc.    4. Walking stimulates the healing process. Dr. Jenkins wants you to accomplish a minimum of 45 minutes of sustained walking per day for exercise. You are encouraged to walk several times a day and there is no limit on how far you can walk. In the  beginning you may only be able to walk 5-15 minutes at a time that is okay just do this a minimum of 4-10x/day.    5. You may remove your white stockings (joao hose) for   hour twice a day. You may discontinue wearing the stockings when you are not spending any time in bed during the day and are walking at least hourly.    6. You may begin driving again when you are no longer taking the narcotic pain medication and feel comfortable with being able to navigate amongst other drivers. You must also wear the collar that you have been provided while driving.    7. You may return to work when you are no longer taking the narcotic pain medication. You must observe the 10-pound lifting restriction (nothing below waist or above chest), frequent change of position from sit, stand, and walking and avoid repetitive bending and twisting. You must also wear the collar that you have been provided.    8. Advancement of physical activities will be discussed at each follow up appointment with Dr. Jenkins. Physical therapy, if needed, will also be discussed at each appointment.    9. Gentle sexual activity is okay. Be a passive participant.    POST OPERATIVE MEDICATIONS    1. If your surgery INCLUDED A FUSION  DO NOT take Ibuprofen, Motrin, Advil, Aleve or any other anti-inflammatory medication or aspirin products for 3 months after surgery. This also includes any medication taken for chronic inflammatory conditions i.e. Methotrexate, Remicaid, Prednisone etc. unless otherwise instructed. Only Tylenol or Tylenol based medications are okay during this time frame.    2. If your surgery DID NOT include a fusion you may resume any over-the-counter     anti-inflammatories (Advil, Ibuprofen, Naproxen etc.) 3-5 days after the surgery.    3. We recommend three 8 ounce glasses of milk daily and a daily supplement of Vitamin D 2000 i.u./day for fusion healing and bone growth.    4. Poor nutrition can lead to delayed wound healing and constipation.  "Good sources of lean proteins and fresh fruits and vegetables will help with this.     5. Narcotic pain medications will also cause constipation. Using over the counter stool softeners, drinking plenty of fluids, ambulation, and good nutrition can help prevent this from occurring.     If you have any questions regarding these instructions please contact Dr. Jenkins at   [phone].        Pending Results     No orders found from 2018 to 2018.            Statement of Approval     Ordered          18 1432  I have reviewed and agree with all the recommendations and orders detailed in this document.  EFFECTIVE NOW     Approved and electronically signed by:  Sil Koch PA-C             Admission Information     Date & Time Provider Department Dept. Phone    2018 Carlos Jenkins MD [address] [facility] [phone]      Your Vitals Were     Blood Pressure Temperature Respirations Height Weight Pulse Oximetry    122/79 (BP Location: Left arm) 97.7  F (36.5  C) (Oral) 20 1.93 m (6' 4\") 94.8 kg (208 lb 14.4 oz) 94%    BMI (Body Mass Index)                   25.43 kg/m2           [employer] Information     [employer] lets you send messages to your doctor, view your test results, renew your prescriptions, schedule appointments and more. To sign up, go to [email or web address] . Click on \"Log in\" on the left side of the screen, which will take you to the Welcome page. Then click on \"Sign up Now\" on the right side of the page.     You will be asked to enter the access code listed below, as well as some personal information. Please follow the directions to create your username and password.     Your access code is: [email or web address]  Expires: 2018  3:04 PM     Your access code will  in 90 days. If you need help or a new code, please call your [facility] or [phone].        Care EveryWhere ID     This is your Care EveryWhere ID. This could be used by other " organizations to access your [facility] medical records  [id number]        Equal Access to Services     RADHA RAMIREZ : Hadii chayo Abdul, carolyne butcher, jinny hinojosa. So [facility] [phone].    ATENCIÓN: Si habla español, tiene a roque disposición servicios gratuitos de asistencia lingüística. Llame al [phone].    We comply with applicable federal civil rights laws and [place] laws. We do not discriminate on the basis of race, color, national origin, age, disability, sex, sexual orientation, or gender identity.               Review of your medicines      START taking        Dose / Directions    * oxyCODONE IR 5 MG tablet   Commonly known as:  ROXICODONE        Dose:  5-10 mg   Take 1-2 tablets (5-10 mg) by mouth every 4 hours as needed for moderate to severe pain   Quantity:  40 tablet   Refills:  0       * oxyCODONE 10 MG 12 hr tablet   Commonly known as:  OxyCONTIN        Dose:  10 mg   Take 1 tablet (10 mg) by mouth every 12 hours maximum 2 tablet(s) per day   Quantity:  10 tablet   Refills:  0       senna-docusate 8.6-50 MG per tablet   Commonly known as:  SENOKOT-S;PERICOLACE        Dose:  2 tablet   Take 2 tablets by mouth 2 times daily   Quantity:  40 tablet   Refills:  0       * Notice:  This list has 2 medication(s) that are the same as other medications prescribed for you. Read the directions carefully, and ask your doctor or other care provider to review them with you.      CONTINUE these medicines which may have CHANGED, or have new prescriptions. If we are uncertain of the size of tablets/capsules you have at home, strength may be listed as something that might have changed.        Dose / Directions    diazepam 5 MG tablet   Commonly known as:  VALIUM   This may have changed:    - how much to take  - when to take this        Dose:  5 mg   Take 1 tablet (5 mg) by mouth every 8 hours as needed (spasm or tightness)   Quantity:  20  tablet   Refills:  0         CONTINUE these medicines which have NOT CHANGED        Dose / Directions    atenolol-chlorthalidone 50-25 MG per tablet   Commonly known as:  TENORETIC   Notes to Patient:  Resume home med schedule        Dose:  1 tablet   Take 1 tablet by mouth daily   Refills:  0       calcium carbonate 600 mg-vitamin D 400 units 600-400 MG-UNIT per tablet   Commonly known as:  CALTRATE        Dose:  1 tablet   Take 1 tablet by mouth 2 times daily   Refills:  0       CIALIS PO   Notes to Patient:  Resume home med schedule        Dose:  5 mg   Take 5 mg by mouth daily as needed for erectile dysfunction   Refills:  0       CRESTOR PO        Dose:  40 mg   Take 40 mg by mouth At Bedtime   Refills:  0       fexofenadine-pseudoePHEDrine  MG per 12 hr tablet   Commonly known as:  ALLEGRA-D        Dose:  1 tablet   Take 1 tablet by mouth daily   Refills:  0       GABAPENTIN PO        Dose:  300 mg   Take 300 mg by mouth 3 times daily   Refills:  0       LEXAPRO PO        Dose:  10 mg   Take 10 mg by mouth daily   Refills:  0       * MIRAPEX PO   Notes to Patient:  Available anytime        Dose:  0.25 mg   Take 0.25 mg by mouth daily as needed   Refills:  0       * MIRAPEX PO        Dose:  0.25 mg   Take 0.25 mg by mouth At Bedtime   Refills:  0       mometasone 50 MCG/ACT spray   Commonly known as:  NASONEX   Notes to Patient:  Resume home med schedule        Dose:  2 spray   Spray 2 sprays into both nostrils At Bedtime   Refills:  0       Multi-vitamin Tabs tablet        Dose:  1 tablet   Take 1 tablet by mouth daily   Refills:  0       testosterone cypionate 200 MG/ML injection   Commonly known as:  DEPOTESTOTERONE   Notes to Patient:  Resume home med schedule        Dose:  50 mg   Inject 50 mg into the muscle every 14 days   Refills:  0       VITAMIN B 12 PO   Notes to Patient:  Resume home med schedule        Dose:  1 tablet   Take 1 tablet by mouth daily   Refills:  0       VITAMIN C PO         Dose:  3 tablet   Take 3 tablets by mouth daily   Refills:  0       VITAMIN D (CHOLECALCIFEROL) PO   Notes to Patient:  Resume home med schedule        Dose:  5000 Units   Take 5,000 Units by mouth daily   Refills:  0       ZINC PO   Notes to Patient:  Resume home med schedule        Dose:  3 tablet   Take 3 tablets by mouth daily   Refills:  0       * Notice:  This list has 2 medication(s) that are the same as other medications prescribed for you. Read the directions carefully, and ask your doctor or other care provider to review them with you.         Where to get your medicines      Some of these will need a paper prescription and others can be bought over the counter. Ask your nurse if you have questions.     Bring a paper prescription for each of these medications     diazepam 5 MG tablet    oxyCODONE 10 MG 12 hr tablet    oxyCODONE IR 5 MG tablet    senna-docusate 8.6-50 MG per tablet                Protect others around you: Learn how to safely use, store and throw away your medicines at www.disposemymeds.org.        Information about OPIOIDS     PRESCRIPTION OPIOIDS: WHAT YOU NEED TO KNOW   We gave you an opioid (narcotic) pain medicine. It is important to manage your pain, but opioids are not always the best choice. You should first try all the other options your care team gave you. Take this medicine for as short a time (and as few doses) as possible.    Some activities can increase your pain, such as bandage changes or therapy sessions. It may help to take your pain medicine 30 to 60 minutes before these activities. Reduce your stress by getting enough sleep, working on hobbies you enjoy and practicing relaxation or meditation. Talk to your care team about ways to manage your pain beyond prescription opioids.    These medicines have risks:    DO NOT drive when on new or higher doses of pain medicine. These medicines can affect your alertness and reaction times, and you could be arrested for driving under  the influence (DUI). If you need to use opioids long-term, talk to your care team about driving.    DO NOT operate heavy machinery    DO NOT do any other dangerous activities while taking these medicines.    DO NOT drink any alcohol while taking these medicines.     If the opioid prescribed includes acetaminophen, DO NOT take with any other medicines that contain acetaminophen. Read all labels carefully. Look for the word  acetaminophen  or  Tylenol.  Ask your pharmacist if you have questions or are unsure.    You can get addicted to pain medicines, especially if you have a history of addiction (chemical, alcohol or substance dependence). Talk to your care team about ways to reduce this risk.    All opioids tend to cause constipation. Drink plenty of water and eat foods that have a lot of fiber, such as fruits, vegetables, prune juice, apple juice and high-fiber cereal. Take a laxative (Miralax, milk of magnesia, Colace, Senna) if you don t move your bowels at least every other day. Other side effects include upset stomach, sleepiness, dizziness, throwing up, tolerance (needing more of the medicine to have the same effect), physical dependence and slowed breathing.    Store your pills in a secure place, locked if possible. We will not replace any lost or stolen medicine. If you don t finish your medicine, please throw away (dispose) as directed by your pharmacist. The [place] [employer] has more information about safe disposal: https://www.pca.state.[place]/living-green/managing-unwanted-medications             Medication List: This is a list of all your medications and when to take them. Check marks below indicate your daily home schedule. Keep this list as a reference.      Medications           Morning Afternoon Evening Bedtime As Needed    atenolol-chlorthalidone 50-25 MG per tablet   Commonly known as:  TENORETIC   Take 1 tablet by mouth daily   Notes to Patient:  Resume home med schedule                                 calcium carbonate 600 mg-vitamin D 400 units 600-400 MG-UNIT per tablet   Commonly known as:  CALTRATE   Take 1 tablet by mouth 2 times daily   Last time this was given:  1 tablet on 9/20/2018  9:48 AM   Next Dose Due:  9/21/18                                   CIALIS PO   Take 5 mg by mouth daily as needed for erectile dysfunction   Notes to Patient:  Resume home med schedule                                CRESTOR PO   Take 40 mg by mouth At Bedtime   Last time this was given:  40 mg on 9/19/2018  9:07 PM   Next Dose Due:  9/20/18                                   diazepam 5 MG tablet   Commonly known as:  VALIUM   Take 1 tablet (5 mg) by mouth every 8 hours as needed (spasm or tightness)   Last time this was given:  5 mg on 9/20/2018 10:42 AM   Next Dose Due:  Available anytime after 4:45pm                                   fexofenadine-pseudoePHEDrine  MG per 12 hr tablet   Commonly known as:  ALLEGRA-D   Take 1 tablet by mouth daily   Last time this was given:  1 tablet on 9/20/2018  9:47 AM   Next Dose Due:  9/21/18                                   GABAPENTIN PO   Take 300 mg by mouth 3 times daily   Last time this was given:  300 mg on 9/20/2018  9:49 AM   Next Dose Due:  9/20/18                                         LEXAPRO PO   Take 10 mg by mouth daily   Last time this was given:  10 mg on 9/20/2018  9:49 AM   Next Dose Due:  9/21/18                                   * MIRAPEX PO   Take 0.25 mg by mouth daily as needed   Last time this was given:  0.25 mg on 9/19/2018  9:10 PM   Notes to Patient:  Available anytime                                   * MIRAPEX PO   Take 0.25 mg by mouth At Bedtime   Last time this was given:  0.25 mg on 9/19/2018  9:10 PM   Next Dose Due:  9/20/18                                   mometasone 50 MCG/ACT spray   Commonly known as:  NASONEX   Spray 2 sprays into both nostrils At Bedtime   Notes to Patient:  Resume home med schedule                                    Multi-vitamin Tabs tablet   Take 1 tablet by mouth daily                                * oxyCODONE IR 5 MG tablet   Commonly known as:  ROXICODONE   Take 1-2 tablets (5-10 mg) by mouth every 4 hours as needed for moderate to severe pain   Last time this was given:  10 mg on 9/20/2018  3:29 PM                                   * oxyCODONE 10 MG 12 hr tablet   Commonly known as:  OxyCONTIN   Take 1 tablet (10 mg) by mouth every 12 hours maximum 2 tablet(s) per day   Last time this was given:  10 mg on 9/20/2018  7:55 AM   Next Dose Due:  9/20/18 at bedtime                                      senna-docusate 8.6-50 MG per tablet   Commonly known as:  SENOKOT-S;PERICOLACE   Take 2 tablets by mouth 2 times daily   Last time this was given:  1 tablet on 9/20/2018  9:49 AM   Next Dose Due:  9/20/18                                      testosterone cypionate 200 MG/ML injection   Commonly known as:  DEPOTESTOTERONE   Inject 50 mg into the muscle every 14 days   Notes to Patient:  Resume home med schedule                                VITAMIN B 12 PO   Take 1 tablet by mouth daily   Notes to Patient:  Resume home med schedule                                VITAMIN C PO   Take 3 tablets by mouth daily   Last time this was given:  1,500 mg on 9/20/2018  9:46 AM   Next Dose Due:  9/20/18                                         VITAMIN D (CHOLECALCIFEROL) PO   Take 5,000 Units by mouth daily   Notes to Patient:  Resume home med schedule                                ZINC PO   Take 3 tablets by mouth daily   Notes to Patient:  Resume home med schedule                                * Notice:  This list has 4 medication(s) that are the same as other medications prescribed for you. Read the directions carefully, and ask your doctor or other care provider to review them with you.

## 2018-09-18 NOTE — IP AVS SNAPSHOT
[address] [facility]    [address]    [place] [address]    Phone:  [phone]                                       After Visit Summary   9/18/2018    Simone Dill    MRN: [id number]           After Visit Summary Signature Page     I have received my discharge instructions, and my questions have been answered. I have discussed any challenges I see with this plan with the nurse or doctor.    ..........................................................................................................................................  Patient/Patient Representative Signature      ..........................................................................................................................................  Patient Representative Print Name and Relationship to Patient    ..................................................               ................................................  Date                                   Time    ..........................................................................................................................................  Reviewed by Signature/Title    ...................................................              ..............................................  Date                                               Time          22EPIC Rev 08/18

## 2018-09-18 NOTE — ANESTHESIA PREPROCEDURE EVALUATION
Procedure: Procedure(s):  COMBINED DISCECTOMY, FUSION CERVICAL ANTERIOR ONE LEVEL  REPLACE DISK CERVICAL ANTERIOR  LAMINECTOMY CERVICAL POSTERIOR THREE+ LEVELS  Preop diagnosis: CERVICAL SPINAL STENOSIS WITH MYOLOPATHY, CENTRAL DISK EXTRUSION AT C5-6    No Known Allergies  Past Medical History:   Diagnosis Date     Anxiety      Hypercholesteremia      Hypertension      Past Surgical History:   Procedure Laterality Date     BACK SURGERY       ENT SURGERY       HERNIA REPAIR       lamenectomy       Prior to Admission medications    Medication Sig Start Date End Date Taking? Authorizing Provider   Ascorbic Acid (VITAMIN C PO) Take 3 tablets by mouth daily   Yes Reported, Patient   atenolol-chlorthalidone (TENORETIC) 50-25 MG per tablet Take 1 tablet by mouth daily   Yes Reported, Patient   calcium carbonate 600 mg-vitamin D 400 units (CALTRATE) 600-400 MG-UNIT per tablet Take 1 tablet by mouth 2 times daily   Yes Reported, Patient   Cyanocobalamin (VITAMIN B 12 PO) Take 1 tablet by mouth daily   Yes Reported, Patient   diazepam (VALIUM) 5 MG tablet Take 1 tablet by mouth every 8 hours as needed (spasm or tightness).  Patient taking differently: Take 0.5-1 mg by mouth daily as needed (spasm or tightness)  11/7/11  Yes Antoine Reed MD   Escitalopram Oxalate (LEXAPRO PO) Take 10 mg by mouth daily   Yes Reported, Patient   fexofenadine-pseudoePHEDrine (ALLEGRA-D)  MG per 12 hr tablet Take 1 tablet by mouth daily   Yes Reported, Patient   GABAPENTIN PO Take 300 mg by mouth 3 times daily    Yes Reported, Patient   mometasone (NASONEX) 50 MCG/ACT spray Spray 2 sprays into both nostrils At Bedtime   Yes Reported, Patient   Multiple Vitamins-Minerals (ZINC PO) Take 3 tablets by mouth daily   Yes Reported, Patient   multivitamin, therapeutic with minerals (MULTI-VITAMIN) TABS tablet Take 1 tablet by mouth daily   Yes Reported, Patient   Pramipexole Dihydrochloride (MIRAPEX PO) Take 0.25 mg by mouth daily as needed    Yes Reported, Patient   Pramipexole Dihydrochloride (MIRAPEX PO) Take 0.25 mg by mouth At Bedtime    Yes Reported, Patient   Rosuvastatin Calcium (CRESTOR PO) Take 40 mg by mouth At Bedtime   Yes Reported, Patient   Tadalafil (CIALIS PO) Take 5 mg by mouth daily as needed for erectile dysfunction   Yes Reported, Patient   testosterone cypionate (DEPOTESTOTERONE) 200 MG/ML injection Inject 50 mg into the muscle every 14 days   Yes Reported, Patient   VITAMIN D, CHOLECALCIFEROL, PO Take 5,000 Units by mouth daily   Yes Reported, Patient     Current Facility-Administered Medications Ordered in Epic   Medication Dose Route Frequency Last Rate Last Dose     ceFAZolin (ANCEF) 1 g vial to attach to  ml bag for ADULT or 50 ml bag for PEDS  1 g Intravenous See Admin Instructions         ceFAZolin (ANCEF) intermittent infusion 2 g in 100 mL dextrose PRE-MIX  2 g Intravenous Pre-Op/Pre-procedure x 1 dose         lactated ringers infusion   Intravenous Continuous         lidocaine 1 % 1 mL  1 mL Other Q1H PRN         No current [facility]-ordered outpatient prescriptions on file.     Wt Readings from Last 1 Encounters:   09/18/18 93 kg (205 lb)     Temp Readings from Last 1 Encounters:   09/18/18 36.1  C (97  F) (Temporal)     BP Readings from Last 6 Encounters:   09/18/18 130/83   10/21/15 (!) 130/92   11/07/11 129/96     Pulse Readings from Last 4 Encounters:   No data found for Pulse     Resp Readings from Last 1 Encounters:   09/18/18 20     SpO2 Readings from Last 1 Encounters:   09/18/18 98%     RECENT LABS: hgb 15.8, plt 225    Anesthesia Evaluation     .             ROS/MED HX    ENT/Pulmonary:       Neurologic:       Cardiovascular:     (+) Dyslipidemia, hypertension----. : . . . :. .       METS/Exercise Tolerance:     Hematologic:         Musculoskeletal:         GI/Hepatic:         Renal/Genitourinary:         Endo:         Psychiatric:     (+) psychiatric history anxiety      Infectious Disease:         Malignancy:          Other:                                    Anesthesia Plan      History & Physical Review      ASA Status:  2 .    NPO Status:  > 8 hours    Plan for General and ETT with Propofol induction. Maintenance will be TIVA.    PONV prophylaxis:  Ondansetron (or other 5HT-3) and Dexamethasone or Solumedrol  Additional equipment: Videolaryngoscope and 2nd IV Pre-op tylenol, oxycontin, gabapentin  TIVA - neuromonitoring  Phenylephrine infusion ready  Hydromorphone, Toradol      Postoperative Care  Postoperative pain management:  Multi-modal analgesia.      Consents  Anesthetic plan, risks, benefits and alternatives discussed with:  Patient..                          .

## 2018-09-18 NOTE — PROGRESS NOTES
Admission medication history interview status for the 9/18/2018  admission is complete. See EPIC admission navigator for prior to admission medications     Medication history source reliability:Good    Medication history interview source(s):Patient    Medication history resources (including written lists, pill bottles, clinic record):Patient mailed in his medication list prior to surgery    Primary pharmacy.[facility]    Additional medication history information not noted on PTA med list :None    Time spent in this activity: 40 minutes    Prior to Admission medications    Medication Sig Last Dose Taking? Auth Provider   Ascorbic Acid (VITAMIN C PO) Take 3 tablets by mouth daily 9/17/2018 at AM Yes Reported, Patient   atenolol-chlorthalidone (TENORETIC) 50-25 MG per tablet Take 1 tablet by mouth daily 9/18/2018 at 0500 Yes Reported, Patient   calcium carbonate 600 mg-vitamin D 400 units (CALTRATE) 600-400 MG-UNIT per tablet Take 1 tablet by mouth 2 times daily 9/17/2018 at PM Yes Reported, Patient   Cyanocobalamin (VITAMIN B 12 PO) Take 1 tablet by mouth daily 9/17/2018 at AM Yes Reported, Patient   diazepam (VALIUM) 5 MG tablet Take 1 tablet by mouth every 8 hours as needed (spasm or tightness).  Patient taking differently: Take 0.5-1 mg by mouth daily as needed (spasm or tightness)  More than a Month at PRN Yes Antoine Reed MD   Escitalopram Oxalate (LEXAPRO PO) Take 10 mg by mouth daily 9/18/2018 at 0500 Yes Reported, Patient   fexofenadine-pseudoePHEDrine (ALLEGRA-D)  MG per 12 hr tablet Take 1 tablet by mouth daily 9/17/2018 at A< Yes Reported, Patient   GABAPENTIN PO Take 300 mg by mouth 3 times daily  9/18/2018 at 0500 Yes Reported, Patient   mometasone (NASONEX) 50 MCG/ACT spray Spray 2 sprays into both nostrils At Bedtime 9/17/2018 at HS Yes Reported, Patient   Multiple Vitamins-Minerals (ZINC PO) Take 3 tablets by mouth daily 9/17/2018 at AM Yes Reported, Patient   multivitamin, therapeutic with  minerals (MULTI-VITAMIN) TABS tablet Take 1 tablet by mouth daily 9/17/2018 at AM Yes Reported, Patient   Pramipexole Dihydrochloride (MIRAPEX PO) Take 0.25 mg by mouth daily as needed Past Week at PRN Yes Reported, Patient   Pramipexole Dihydrochloride (MIRAPEX PO) Take 0.25 mg by mouth At Bedtime  9/17/2018 at PM Yes Reported, Patient   Rosuvastatin Calcium (CRESTOR PO) Take 40 mg by mouth At Bedtime 9/17/2018 at PM Yes Reported, Patient   Tadalafil (CIALIS PO) Take 5 mg by mouth daily as needed for erectile dysfunction 9/15/2018 at PRN Yes Reported, Patient   testosterone cypionate (DEPOTESTOTERONE) 200 MG/ML injection Inject 50 mg into the muscle every 14 days 9/8/2018 Yes Reported, Patient   VITAMIN D, CHOLECALCIFEROL, PO Take 5,000 Units by mouth daily 9/17/2018 at AM Yes Reported, Patient

## 2018-09-18 NOTE — ANESTHESIA CARE TRANSFER NOTE
Patient: Jimmy Dill    Procedure(s):  C5-6 ANTERIOR CERVICAL DISCECTOMY AND TOTAL DISK REPLACEMENT USING THE JIMMY DISK, POSTERIOR C3-6 LAMINOPLASTY WITH INSTRUMENTATION, FORAMINOTOMY C5 AND C6 ON RIGHT SIDE - Wound Class: I-Clean   - Wound Class: I-Clean   - Wound Class: I-Clean    Diagnosis: CERVICAL SPINAL STENOSIS WITH MYOLOPATHY, CENTRAL DISK EXTRUSION AT C5-6  Diagnosis Additional Information: No value filed.    Anesthesia Type:   General, ETT     Note:  Airway :Face Mask  Patient transferred to:PACU  Handoff Report: Identifed the Patient, Identified the Reponsible Provider, Reviewed the pertinent medical history, Discussed the surgical course, Reviewed Intra-OP anesthesia mangement and issues during anesthesia, Set expectations for post-procedure period and Allowed opportunity for questions and acknowledgement of understanding    Patient prone in Mullins tongs at conclusion of procedure, patient turned supine in Santana table and then head and neck controlled by Rowena Zamorano PA-C during transfer to recovery cart. Neuromuscular blockade not used after last nondepolarizer dose at 0933 (> 5 hours since last dose,) spontaneous return of TOF 4/4 with sustained tetany, spontaneous respirations, adequate tidal volumes, followed commands to voice appropriately, oropharynx suctioned with soft flexible catheter, extubated atraumatically, extubated with suction, airway patent after extubation.  Oxygen via facemask at 10 liters per minute to PACU. Oxygen tubing connected to wall O2 in PACU, SpO2, NiBP, and EKG monitors and alarms on and functioning, report on patient's clinical status given to PACU RN, RN questions answered. Dr. Bassett at bedside in PACU at handoff, arterial line blood pressure 160s/90s and blood pressure goals ordered by Dr. Bassett to PACU staff.    Electronically Signed By: JOHNY Cormier CRNA  September 18, 2018  3:54 PM

## 2018-09-18 NOTE — PROGRESS NOTES
hospitalist consult received; pt is 51yo male with PMH controlled HTN, HLD, cervical stenosis who is s/p discectomy with total disc replacement and laminectomy. Pt does not have chronic medical conditions requiring daily monitoring, hospitalist will sign off at this time. PTA medication list reviewed & PTA atenolol resumed. Please call with questions/concerns.    Jg Askew PA-C  9/18/2018, 6:54 PM  Pager: [phone]

## 2018-09-19 ENCOUNTER — APPOINTMENT (OUTPATIENT)
Dept: PHYSICAL THERAPY | Facility: CLINIC | Age: 53
DRG: 518 | End: 2018-09-19
Attending: ORTHOPAEDIC SURGERY
Payer: COMMERCIAL

## 2018-09-19 ENCOUNTER — APPOINTMENT (OUTPATIENT)
Dept: CT IMAGING | Facility: CLINIC | Age: 53
DRG: 518 | End: 2018-09-19
Attending: ORTHOPAEDIC SURGERY
Payer: COMMERCIAL

## 2018-09-19 LAB
ANION GAP SERPL CALCULATED.3IONS-SCNC: 6 MMOL/L (ref 3–14)
BUN SERPL-MCNC: 7 MG/DL (ref 7–30)
CALCIUM SERPL-MCNC: 8 MG/DL (ref 8.5–10.1)
CHLORIDE SERPL-SCNC: 96 MMOL/L (ref 94–109)
CO2 SERPL-SCNC: 34 MMOL/L (ref 20–32)
CREAT SERPL-MCNC: 0.97 MG/DL (ref 0.66–1.25)
GFR SERPL CREATININE-BSD FRML MDRD: 82 ML/MIN/1.7M2
GLUCOSE SERPL-MCNC: 124 MG/DL (ref 70–99)
HGB BLD-MCNC: 13.5 G/DL (ref 13.3–17.7)
POTASSIUM SERPL-SCNC: 3.3 MMOL/L (ref 3.4–5.3)
POTASSIUM SERPL-SCNC: 3.3 MMOL/L (ref 3.4–5.3)
SODIUM SERPL-SCNC: 136 MMOL/L (ref 133–144)

## 2018-09-19 PROCEDURE — 84132 ASSAY OF SERUM POTASSIUM: CPT | Performed by: ORTHOPAEDIC SURGERY

## 2018-09-19 PROCEDURE — 97530 THERAPEUTIC ACTIVITIES: CPT | Mod: GP

## 2018-09-19 PROCEDURE — 25000132 ZZH RX MED GY IP 250 OP 250 PS 637: Performed by: ORTHOPAEDIC SURGERY

## 2018-09-19 PROCEDURE — 97110 THERAPEUTIC EXERCISES: CPT | Mod: GP

## 2018-09-19 PROCEDURE — 97116 GAIT TRAINING THERAPY: CPT | Mod: GP

## 2018-09-19 PROCEDURE — 85018 HEMOGLOBIN: CPT | Performed by: ORTHOPAEDIC SURGERY

## 2018-09-19 PROCEDURE — 36415 COLL VENOUS BLD VENIPUNCTURE: CPT | Performed by: ORTHOPAEDIC SURGERY

## 2018-09-19 PROCEDURE — 25000128 H RX IP 250 OP 636: Performed by: ORTHOPAEDIC SURGERY

## 2018-09-19 PROCEDURE — 12000000 ZZH R&B MED SURG/OB

## 2018-09-19 PROCEDURE — 40000193 ZZH STATISTIC PT WARD VISIT

## 2018-09-19 PROCEDURE — 80048 BASIC METABOLIC PNL TOTAL CA: CPT | Performed by: ORTHOPAEDIC SURGERY

## 2018-09-19 PROCEDURE — 72125 CT NECK SPINE W/O DYE: CPT

## 2018-09-19 PROCEDURE — 97161 PT EVAL LOW COMPLEX 20 MIN: CPT | Mod: GP

## 2018-09-19 RX ORDER — POTASSIUM CHLORIDE 29.8 MG/ML
20 INJECTION INTRAVENOUS
Status: DISCONTINUED | OUTPATIENT
Start: 2018-09-19 | End: 2018-09-20 | Stop reason: HOSPADM

## 2018-09-19 RX ORDER — POTASSIUM CHLORIDE 1.5 G/1.58G
20-40 POWDER, FOR SOLUTION ORAL
Status: DISCONTINUED | OUTPATIENT
Start: 2018-09-19 | End: 2018-09-20 | Stop reason: HOSPADM

## 2018-09-19 RX ORDER — POTASSIUM CHLORIDE 7.45 MG/ML
10 INJECTION INTRAVENOUS
Status: DISCONTINUED | OUTPATIENT
Start: 2018-09-19 | End: 2018-09-20 | Stop reason: HOSPADM

## 2018-09-19 RX ORDER — POTASSIUM CHLORIDE 1500 MG/1
20-40 TABLET, EXTENDED RELEASE ORAL
Status: DISCONTINUED | OUTPATIENT
Start: 2018-09-19 | End: 2018-09-20 | Stop reason: HOSPADM

## 2018-09-19 RX ORDER — POTASSIUM CL/LIDO/0.9 % NACL 10MEQ/0.1L
10 INTRAVENOUS SOLUTION, PIGGYBACK (ML) INTRAVENOUS
Status: DISCONTINUED | OUTPATIENT
Start: 2018-09-19 | End: 2018-09-20 | Stop reason: HOSPADM

## 2018-09-19 RX ADMIN — Medication 1 TABLET: at 20:14

## 2018-09-19 RX ADMIN — OXYCODONE HYDROCHLORIDE 10 MG: 5 TABLET ORAL at 16:57

## 2018-09-19 RX ADMIN — DIAZEPAM 5 MG: 5 TABLET ORAL at 19:14

## 2018-09-19 RX ADMIN — FEXOFENADINE HYDROCHLORIDE AND PSEUDOEPHEDRINE HYDROCHLORIDE 1 TABLET: 60; 120 TABLET, FILM COATED, EXTENDED RELEASE ORAL at 08:29

## 2018-09-19 RX ADMIN — OXYCODONE HYDROCHLORIDE 10 MG: 5 TABLET ORAL at 01:27

## 2018-09-19 RX ADMIN — INDOMETHACIN 50 MG: 50 CAPSULE ORAL at 08:26

## 2018-09-19 RX ADMIN — ACETAMINOPHEN 975 MG: 325 TABLET, FILM COATED ORAL at 19:08

## 2018-09-19 RX ADMIN — ESCITALOPRAM 10 MG: 10 TABLET, FILM COATED ORAL at 08:28

## 2018-09-19 RX ADMIN — ACETAMINOPHEN 975 MG: 325 TABLET, FILM COATED ORAL at 01:27

## 2018-09-19 RX ADMIN — OXYCODONE HYDROCHLORIDE 10 MG: 10 TABLET, FILM COATED, EXTENDED RELEASE ORAL at 08:29

## 2018-09-19 RX ADMIN — SENNOSIDES AND DOCUSATE SODIUM 2 TABLET: 8.6; 5 TABLET ORAL at 20:15

## 2018-09-19 RX ADMIN — DIAZEPAM 5 MG: 5 TABLET ORAL at 04:25

## 2018-09-19 RX ADMIN — PRAMIPEXOLE DIHYDROCHLORIDE 0.25 MG: 0.25 TABLET ORAL at 21:10

## 2018-09-19 RX ADMIN — Medication 0.5 MG: at 04:25

## 2018-09-19 RX ADMIN — GABAPENTIN 300 MG: 300 CAPSULE ORAL at 08:26

## 2018-09-19 RX ADMIN — SENNOSIDES AND DOCUSATE SODIUM 1 TABLET: 8.6; 5 TABLET ORAL at 08:29

## 2018-09-19 RX ADMIN — ROSUVASTATIN CALCIUM 40 MG: 20 TABLET, FILM COATED ORAL at 21:07

## 2018-09-19 RX ADMIN — INDOMETHACIN 50 MG: 50 CAPSULE ORAL at 11:38

## 2018-09-19 RX ADMIN — OXYCODONE HYDROCHLORIDE AND ACETAMINOPHEN 1500 MG: 500 TABLET ORAL at 11:36

## 2018-09-19 RX ADMIN — CHLORTHALIDONE 25 MG: 25 TABLET ORAL at 08:28

## 2018-09-19 RX ADMIN — GABAPENTIN 300 MG: 300 CAPSULE ORAL at 21:09

## 2018-09-19 RX ADMIN — OXYCODONE HYDROCHLORIDE 10 MG: 5 TABLET ORAL at 23:14

## 2018-09-19 RX ADMIN — Medication 1 TABLET: at 08:28

## 2018-09-19 RX ADMIN — INDOMETHACIN 50 MG: 50 CAPSULE ORAL at 19:15

## 2018-09-19 RX ADMIN — ATENOLOL 50 MG: 50 TABLET ORAL at 08:29

## 2018-09-19 RX ADMIN — OXYCODONE HYDROCHLORIDE 10 MG: 5 TABLET ORAL at 09:27

## 2018-09-19 RX ADMIN — GABAPENTIN 300 MG: 300 CAPSULE ORAL at 16:57

## 2018-09-19 RX ADMIN — Medication 100 MCG: at 08:29

## 2018-09-19 RX ADMIN — MAGNESIUM HYDROXIDE 30 ML: 400 SUSPENSION ORAL at 21:07

## 2018-09-19 RX ADMIN — OXYCODONE HYDROCHLORIDE 10 MG: 5 TABLET ORAL at 05:31

## 2018-09-19 RX ADMIN — OXYCODONE HYDROCHLORIDE 10 MG: 5 TABLET ORAL at 13:27

## 2018-09-19 RX ADMIN — ACETAMINOPHEN 975 MG: 325 TABLET, FILM COATED ORAL at 09:26

## 2018-09-19 RX ADMIN — POTASSIUM CHLORIDE 20 MEQ: 1.5 POWDER, FOR SOLUTION ORAL at 21:06

## 2018-09-19 RX ADMIN — OXYCODONE HYDROCHLORIDE 10 MG: 10 TABLET, FILM COATED, EXTENDED RELEASE ORAL at 20:13

## 2018-09-19 RX ADMIN — CHOLECALCIFEROL CAP 125 MCG (5000 UNIT) 5000 UNITS: 125 CAP at 08:29

## 2018-09-19 RX ADMIN — Medication 0.5 MG: at 02:25

## 2018-09-19 RX ADMIN — POTASSIUM CHLORIDE 40 MEQ: 1.5 POWDER, FOR SOLUTION ORAL at 19:04

## 2018-09-19 RX ADMIN — Medication 0.3 MG: at 00:09

## 2018-09-19 RX ADMIN — OXYCODONE HYDROCHLORIDE 10 MG: 5 TABLET ORAL at 20:12

## 2018-09-19 RX ADMIN — CEFAZOLIN 1 G: 1 INJECTION, POWDER, FOR SOLUTION INTRAMUSCULAR; INTRAVENOUS at 05:02

## 2018-09-19 ASSESSMENT — ACTIVITIES OF DAILY LIVING (ADL)
ADLS_ACUITY_SCORE: 9
ADLS_ACUITY_SCORE: 10
ADLS_ACUITY_SCORE: 9
ADLS_ACUITY_SCORE: 9

## 2018-09-19 ASSESSMENT — VISUAL ACUITY
OU: NORMAL ACUITY
OU: NORMAL ACUITY

## 2018-09-19 NOTE — PLAN OF CARE
Problem: Patient Care Overview  Goal: Plan of Care/Patient Progress Review  OT: order received, chart reviewed, discussed with PT. Pt declining OT eval at this time as he reports he just laid down and is exhausted. Pt requesting OT to stop back tomorrow for evaluation and tx. Will continue to follow and re-schedule for tomorrow.

## 2018-09-19 NOTE — PROGRESS NOTES
" 09/19/18 0900   Quick Adds   Type of Visit Initial PT Evaluation   Living Environment   Lives With spouse;child(nando), dependent   Living Arrangements house   Home Accessibility stairs to enter home;stairs within home   Number of Stairs to Enter Home 5   Number of Stairs Within Home 10   Stair Railings at Home inside, present on right side   Transportation Available car;family or friend will provide   Living Environment Comment bedroom upstairs   Self-Care   Dominant Hand right   Usual Activity Tolerance good   Current Activity Tolerance moderate   Regular Exercise yes   Activity/Exercise Type walking   Exercise Amount/Frequency 45 mins;daily   Functional Level Prior   Ambulation 0-->independent   Transferring 0-->independent   Toileting 0-->independent   Bathing 0-->independent   Dressing 0-->independent   Eating 0-->independent   Communication 0-->understands/communicates without difficulty   Swallowing 0-->swallows foods/liquids without difficulty   Cognition 0 - no cognition issues reported   Fall history within last six months no   Prior Functional Level Comment Pt is a principal in an elementary school   General Information   Onset of Illness/Injury or Date of Surgery - Date 09/18/18   Referring Physician Carlos Jenkins MD   Patient/Family Goals Statement \"Go home\"   Pertinent History of Current Problem (include personal factors and/or comorbidities that impact the POC) 52 YOM admitted with cervical spinal stenosis with myelopathy and disc protrusion at C5-6. S/p C5-6 anterior cervial discectomy with total disc replacement, post C3-6 laminoplasty with instrumentation, R C5 and C6 foraminotomy. PMH: HTN, HLD   Precautions/Limitations fall precautions;spinal precautions;other (see comments)  (soft collar on at all times)   Weight-Bearing Status - LLE full weight-bearing   Weight-Bearing Status - RLE full weight-bearing   General Observations Pleasant and cooperative   General Info Comments Activity: " ambulate, UE/LE ROM programs   Cognitive Status Examination   Orientation orientation to person, place and time   Level of Consciousness alert   Follows Commands and Answers Questions 100% of the time;able to follow multistep instructions   Personal Safety and Judgment intact   Memory intact   Cognitive Comment no concerns   Pain Assessment   Patient Currently in Pain Yes, see Vital Sign flowsheet  (7/10 post incision)   Integumentary/Edema   Integumentary/Edema Comments A/P cervical incisons with 2 YUDELKA drains   Posture    Posture Forward head position   Range of Motion (ROM)   ROM Comment BUEs WFL, BLE WFL   Strength   Strength Comments BUEs move with effort against gravity at shoulder, easily moves elbows and hands. BLEs grossly 5/5 throughout   Bed Mobility   Bed Mobility Comments Supine>sit with Maria E   Transfer Skills   Transfer Comments Sit>stand from EOB with CGA   Gait   Gait Comments Gait without a device x 10' with CGA, reciprocal pattern, wide LEONID, good foot clearance B   Balance   Balance Comments Good sitting and standing   Sensory Examination   Sensory Perception Comments Pt denies any numbness currently. Presented with numbness in forearms/hands and calves/feet at times   Coordination   Coordination no deficits were identified   General Therapy Interventions   Planned Therapy Interventions bed mobility training;gait training;ROM;transfer training;home program guidelines;other (see comments)   Intervention Comments posture and body mechanics education   Clinical Impression   Criteria for Skilled Therapeutic Intervention yes, treatment indicated   PT Diagnosis Impaired gait   Influenced by the following impairments pain, fatigue   Functional limitations due to impairments bed mobility, transfers, gait   Clinical Presentation Stable/Uncomplicated   Clinical Presentation Rationale see above   Clinical Decision Making (Complexity) Low complexity   Therapy Frequency` daily   Predicted Duration of Therapy  "Intervention (days/wks) 3 days   Anticipated Discharge Disposition Home with Assist   Risk & Benefits of therapy have been explained Yes   Patient, Family & other staff in agreement with plan of care Yes   [facility] TM \"6 Clicks\"   2016, Trustees of [facility], under license to [employer].  All rights reserved.   6 Clicks Short Forms Basic Mobility Inpatient Short Form   [facility]  \"6 Clicks\" V.2 Basic Mobility Inpatient Short Form   1. Turning from your back to your side while in a flat bed without using bedrails? 3 - A Little   2. Moving from lying on your back to sitting on the side of a flat bed without using bedrails? 3 - A Little   3. Moving to and from a bed to a chair (including a wheelchair)? 3 - A Little   4. Standing up from a chair using your arms (e.g., wheelchair, or bedside chair)? 3 - A Little   5. To walk in hospital room? 3 - A Little   6. Climbing 3-5 steps with a railing? 3 - A Little   Basic Mobility Raw Score (Score out of 24.Lower scores equate to lower levels of function) 18   Total Evaluation Time   Total Evaluation Time (Minutes) 10     "

## 2018-09-19 NOTE — PROGRESS NOTES
History:   Preoperative arm numbness improved. Complains of posterior neck pain.Tolerating liquid diet, denies throat clearing or coughing after drinking.  Pain controlled poorly last nigh, better now t.  A January  Vitals:   B/P: 137/86, T: 97.7, P: Data Unavailable, R: 16    Intake/Output Summary (Last 24 hours) at 09/19/18 0735  Last data filed at 09/19/18 0603   Gross per 24 hour   Intake             4880 ml   Output             4090 ml   Net              790 ml          Lab Results   Component Value Date     09/12/2008     09/04/2008    Lab Results   Component Value Date    CHLORIDE 101 09/12/2008    CHLORIDE 104 09/04/2008    Lab Results   Component Value Date    BUN 14 09/12/2008    BUN 15 09/04/2008      Lab Results   Component Value Date    POTASSIUM 3.3 09/18/2018    POTASSIUM 3.6 09/12/2008    POTASSIUM 3.4 09/04/2008    Lab Results   Component Value Date    CO2 28 09/12/2008    CO2 28 09/04/2008    Lab Results   Component Value Date    CR 1.03 09/18/2018    CR 0.85 09/12/2008    CR 0.73 09/04/2008        Lab Results   Component Value Date    WBC 10.2 09/12/2008    WBC 5.7 09/05/2008    WBC 6.2 09/04/2008    HGB 13.9 09/12/2008    HGB 13.1 (L) 09/05/2008    HGB 12.3 (L) 09/04/2008    HCT 40.4 09/12/2008    HCT 37.3 (L) 09/05/2008    HCT 35.2 (L) 09/04/2008    MCV 95 09/12/2008    MCV 95 09/05/2008    MCV 93 09/04/2008     09/12/2008     09/05/2008     09/04/2008        Results for orders placed or performed during the hospital encounter of 09/18/18 (from the past 24 hour(s))   XR Surgery MICHAEL Fluoro L/T 5 Min w Stills    Narrative    SURGERY C-ARM FLUORO LESS THAN 5 MIN W STILLS September 18, 2018 11:05  AM     HISTORY: Cervical C5-C6 anterior discectomy and implant. Fluoro 0.4  min, six images, OR 32, C-arm #4, [id number], LLOYD/AAG.     COMPARISON: None.      Impression    IMPRESSION: Total of six intraoperative radiographs obtained  demonstrating intervertebral disc spacer  in the cervical spine at  presumed level at C5-C6. Total fluoroscopic time is 0.4 minutes.    MAXIMILIAN MALDONADO MD   XR Cervical Spine Port 1 View    Narrative    CERVICAL SPINE PORTABLE ONE VIEW   9/18/2018 12:25 PM     HISTORY: Cervical fusion.     COMPARISON: Same day intraoperative x-ray    FINDINGS: Interbody spacer/graft has been placed at the level of C5-6.  Lower cervical spine is not included within the field-of-view. Skin  clips are present posteriorly. Endotracheal and enteric tube are  visualized.      Impression    IMPRESSION: Changes of C5-6 interbody spacer/graft material for  fusion.    ZUHAIR SHIN MD   XR Cervical Spine Port 1 View    Narrative    CERVICAL SPINE PORTABLE ONE VIEW   9/18/2018 2:34 PM     HISTORY: Cervical fusion.     COMPARISON: Same day intraoperative x-rays.    FINDINGS: C4, C5, and C6 laminoplasty changes are demonstrated. C5-6  interbody spacer/graft material is noted. There has been no other  change compared to the prior intraoperative x-ray.      Impression    IMPRESSION: New C4, C5, and C6 laminoplasty changes noted.    ZUHAIR SHIN MD   XR Cervical Spine Port 2/3 Views    Narrative    XR CERVICAL SPINE PORT 2/3 VW 9/18/2018 4:25 PM    HISTORY: Postop.     COMPARISON: September 18, 2018.      Impression    IMPRESSION: Postoperative changes of C5-C6, as before. Interval  removal of the endotracheal tube.     SALAZAR MADSEN MD     YUDELKA output, posterior 85 cc, anterior 5 cc since MN.  Dressing:   Dry and intact. Brace/collar intact and well fitted.   Neuro:   Motor: 5/5   Sensation: intact      A/P:   Stable POD # 1   Mobilize.  Pain control: Is on OxyContin and oxycodone already.  I expect his pain control issues are partly related to anxiety.  Discussed with nursing staff and encouraged them to use Valium generously.  If he still requires large amounts of IV Dilaudid for breakthrough pain we will increase OxyContin this afternoon.

## 2018-09-19 NOTE — PLAN OF CARE
Problem: Patient Care Overview  Goal: Plan of Care/Patient Progress Review  Outcome: No Change  A&OX4. CMS intact ex LUE numbness and tingling. Scleral edema, Localized L temporal lobe from Vasquez pin intraoperatively. Bowel sounds active in all four quadrants. DD-3 diet. Completed 3 PVR's max 714, voided 500 mL and PVR after was 23 mL. VSS on 2 L of oxygen when sleeping. Dressing intact ex moderate serosanguinous drainage from posterior site, dressing marked and reenforced . YUDELKA drains draining adequately, 85 mL posterior, 5 mL anterior. Up with 2 Assist. Severe pain rating at 10, decreased with Dilaudidx3, Oxycodonex2, Valiumx1, ice /heat pack, and repositioning. Soft collar on at all times. Saline Locked. Nursing continue to monitor.

## 2018-09-19 NOTE — PLAN OF CARE
Problem: PT General Care Plan  Goal: PT Goal 1  PT Goal 1  Pt will demonstrate independence in a UE/LE HEP to improve UE ROM and strength for mobility.

## 2018-09-19 NOTE — PROVIDER NOTIFICATION
MD Notification    Notified Person: MD    Notified Person Name:dina    Notification Date/Time:9/18 2032    Notification Interaction:text    Purpose of Notification:FYI Pt.'s BP is 185/112 HR 95 after hydralazine administration at 1952. Please advise. Thanks!      2035 Advised to give valium as all other PRN and scheduled narcotics have been given. Will call on-call if pt.'s pain is uncontrolled w/ ordered meds.    2042 PRN labetalol ordered.

## 2018-09-19 NOTE — PROVIDER NOTIFICATION
MD Notification    Notified Person: MD    Notified Person Name:dina    Notification Date/Time:9/18/18 1919    Notification Interaction:text    Purpose of Notification:FYI Pt.'s /110, can we have PRN meds available to give? Thanks!    1928 PRN 10mg IV Hydralazine ordered for SBP>170

## 2018-09-19 NOTE — PLAN OF CARE
Problem: Patient Care Overview  Goal: Plan of Care/Patient Progress Review  Outcome: Improving  POD 1. A&O times 4. CMS intact, denies numbness or tingling. Bowel sounds present, passing gas, tolerating regular, soft diet. VSS. Dressing posterior marked, YUDELKA drain in place. Anterior dressing changed, steri strips in place, no drainage, anterior YUDELKA drain removed. Posterior YUDELKA drain still in place, with output of 25 ml. Sof cervical collar in place. Up with assist of one, with GB, ambulated to bathroom and in hallway. Voiding adequately in bathroom. Neck pain decreased with Oxycodone, Oxycontin and Tylenol. Potassium re-check later this afternoon.

## 2018-09-19 NOTE — PLAN OF CARE
Problem: Patient Care Overview  Goal: Plan of Care/Patient Progress Review  PT: PT orders received, initial eval completed and treatment initiated. Patient lives with his wife and 3 kids in a house with stairs to enter and to access his bedroom. Previously independent with all ADLs and mobility without a device, drives, works full time as a  principal. Pt admitted with cervical spinal stenosis with myelopathy and disc protrusion at C5-6. S/p C5-6 anterior cervial discectomy with total disc replacement, post C3-6 laminoplasty with instrumentation, R C5 and C6 foraminotomy.     Discharge Planner PT   Patient plan for discharge: home  Current status: Instructed pt in spine precautions and log roll technique. Pt requires Maria E for bed mobility; CGA for transfers and gait x 250' without a device. Provided handout and education for proper posture, positioning and body mechanics with functional tasks. Instructed pt and provided handout for UE AROM program and AP, QS, LAQ as ordered. Pt would benefit from an OT eval to instruct in correct technique for ADLs within spine precautions.  Barriers to return to prior living situation: pain, level of assist required, stairs  Recommendations for discharge: hoe with family assist for household tasks  Rationale for recommendations: Pt is mobilizing well. Anticipate home at hospital disch with UE AROM and ambulation home programs.        Entered by: Kristy Azevedo 09/19/2018 12:35 PM

## 2018-09-19 NOTE — PLAN OF CARE
Problem: Patient Care Overview  Goal: Plan of Care/Patient Progress Review  Outcome: No Change  A&Ox4. POD 0. Anxious and restless. Scleral edema bilat. Localized swelling L temporal lobe from loredo pin intraoperatively, Dr. Jenkins aware. CMS intact. C-collar on at all times. Dressing Anterior and Posterior, CDI.  Clear liquid diet, tolerating well advance to Full liquid in morning. Bowel sounds active in all quadrants, - flatus. VSS, elevated BP given PRN's. Anterior YUDELKA output, 5mL. Posterior YUDELKA output, 70 mL. Up with 2, refused to dangle. Voided 900 mL, scanned at 488. Will wait to see if pt. Can void again before cathing. C/o severe neck pain, decreased with scheduled oxycontin, PRN oxycodone, valium, and ice. Plan pending progress.      Addendum: Pt. Voided 550 mL, scanned at 142 mL. Continue to monitor for retention.

## 2018-09-19 NOTE — OP NOTE
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      POSTOPERATIVE DIAGNOSIS:  Cervical spinal stenosis with myelopathy and central disk extrusion at the C5-C6 level.      PROCEDURE: Anterior cervical diskectomy and total disk replacement at C5 to C6 using the Simone disk, C4 through C6 laminoplasties with instrumentation and foraminotomies on the right side at C5 and C6 levels.      SURGEON: Carlos Jenkins MD      ASSISTANT: Rowena Zamorano PA-C      ANESTHESIA:  General with an endotracheal tube.      INDICATIONS FOR PROCEDURE:  Mr. Dill is a 52-year-old man who has congenital cervical spinal stenosis.  He was asymptomatic until two or three years ago at which time he developed interscapular pain, and 3 months ago he developed bilateral arm and leg numbness and tingling.  His symptoms of numbness and tingling were much worse in the right arm than elsewhere.  His preoperative MRI scan revealed central stenosis from C3-4 interlaminar space down through C6-7 interlaminar space with a central disk herniation at C5-6 causing severe central stenosis.  It was felt that the likely source of his numbness was the central disk herniation at C5 to C6 and a discussion was had about simply doing a C5-6 anterior cervical diskectomy and total disk replacement or proceeding with prophylactic laminoplasty under the same anesthetic.  He elected to do both procedures.      DESCRIPTION OF PROCEDURE:  After informed consent was obtained from the patient and satisfactory general anesthesia achieved, the technician from [facility] neuromonitoring affixed his stimulating and recording electrodes for SSEP, MEP and EMG monitoring and baseline tracings were run, which were normal.  The patient's head was then placed on a foam donut. Adhesive tape was used to distract his shoulders distally.  His neck was  prepped and draped in a sterile fashion.  After positioning, MEPs were again run and they were stable with baseline.        After appropriate surgical site and patient identification, a transverse incision measuring 3 cm was made at the level of the distal thyroid cartilage from the midline to the left.  The platysma muscle was isolated along its inferior surface and divided transversely.  The interval between the sternocleidomastoid muscle and the sternothyroid muscle was divided bluntly.  The omohyoid muscle was identified and dissection continued proximal to it.  The interval between the trachea, esophagus and carotid sheath was divided bluntly.  Precervical fascia was divided.  A 22-gauge spinal needle was placed into what was thought to be C5 to C6 disk space.  A lateral C-arm image was obtained showing that it was at C6 to C7. Attention was turned one level proximal to C5 to C6.  The longus coli muscles were dissected off the C5 and C6 vertebral bodies and the annulus was cut with the Bovie.  A Blas elevator was used to dissect the disk off the inferior endplate of C5 and superior endplate of C6, and the disk was removed with curets and a pituitary.  Distraction pins were placed in C5 and C6 vertebral bodies using the Simone guide and distraction was placed across the disk space.  The disk was removed in its entirety from uncovertebral joint to uncovertebral joint and all the way back to the posterior longitudinal ligament.  The Midas drill was brought in to thin the uncovertebral spurs and the ligamentum flavum was taken down, and foraminotomies were performed bilaterally.  There was a central disk herniation which was removed in one large fragment.        The endplates were made parallel and under C-arm guidance, the Simone trials were placed.  The 16 mm trial fit most precisely.  Using the Simone machining device, the endplates were prepared to accept the 16 mm disk replacement and it was tapped into place  with a drift and mallet.  Distraction was taken off the distraction pins.  AP and lateral x-rays were obtained showing that the proximal portion of the implant was a few millimeters proud from the anterior border of the spine.  The inferior portion was aligned anatomically and it was felt that this was acceptable as the anterior portion of the C6 vertebral body extended out beyond the C5 vertebral body a few millimeters.  Attempt was made to dislocate the implant and this was unsuccessful. The wound was irrigated with copious amounts of antibiotic solution.  A 10-Nigerian YUDELKA drain was placed along the anterior border of the spine and brought out inferior to the incision. The platysma muscle was reapproximated with multiple simple sutures of 2-0 Vicryl.  Skin was closed with a running subcuticular stitch of 3-0 Vicryl.  Sterile Steri-Strips and dressing were applied.  The YUDELKA was hooked to suction.  Following the anterior procedure, the MEPs were stable and at baseline.        A Vasquez headholder was placed on the patient's skull and affixed to the operating table.  He was then turned to the prone position using the Santana spinal frame.  His arms were repositioned again with the adhesive tape over the shoulders to distract them and his head was placed into a slight bit of flexion with the table in a bit of reversed Trendelenberg.  MEPs were again run after positioning and were found to be at baseline.        Again, after appropriate site and patient identification, a longitudinal incision measuring 15 cm was made from the C3 level down to C7.  Marcaine 0.25% with epinephrine was infiltrated in the skin edges to aid with hemostasis and postoperative pain control.  Subcutaneous dissection was continued with the Bovie down to the nuchal ligament.  This was divided longitudinally and the dissection continued in the intermuscular septum down to the spinous processes. The spinous processes of C4, C5 and C6 were completely  exposed.  The superior edge of C7 and the inferior edge of C3 were also exposed.  A lateral x-ray was obtained with a clamp on the C6 spinous process to confirm the levels.  The Midas drill was brought in and the lamina cut at the junction of the lamina and the lateral mass on the right side at C4, C5 and C6.  On the left side, a trough was cut so that the lamina could be gently bent open hinging on the trough on the left.  The ligamentum flavum and interspinous ligaments were taken down at C3-4 and C6-7.  The laminoplasty door was opened.  After opening MEPs were run and were stable.  Foraminotomies were performed on the right side at C5 and C6.  After the foraminotomies, a nerve hook could be passed along the path of the C5 and C6 nerves and there was no evidence of any further tethering material.  Synthes maxillofacial plates were then selected and placed onto the lateral masses and the lamina to hold the laminoplasty door open.  Two screws were placed in the lateral mass at C5 and C6 and one screw at C4, 2 screws were placed in the lamina at C4 and C5, and 1 at C6.  A lateral x-ray was obtained showing good position of the hardware at the C4, C5 and C6 levels.  The wound was irrigated with copious amounts of antibiotic solution.  A medium Hemovac drain was placed in the submuscular space and brought out to the right of the incision.  The nuchal ligament was reapproximated with multiple figure-of-eight sutures of #1 Vicryl.  The skin was closed with multiple subcutaneous sutures of 2-0 Vicryl and a running subcuticular stitch of 3-0 Vicryl.  Sterile Steri-Strips and dressing were applied.  The Hemovac was hooked to a YUDELKA bulb.  The patient was placed into a soft cervical collar, was turned back to the supine position, had the Vasquez head pins removed, and was awakened, extubated, and left the operating room in good condition.  Estimated blood loss for both procedures was 100 mL.  Replacement was 3300 mL  crystalloid.  There were no complications.  Total time for the procedure from skin to skin was 340 minutes.       KI Warren's assistance was essential during the entire procedure for safe retraction of visceral and neural tissue, suctioning of blood for visualization and holding alignment during placement of the implants.    ROSS SHERIDAN MD             D: 2018   T: 2018   MT:       Name:     JIMMY LOPEZ   MRN:      [id number]        Account:        [id number]   :      1965           Procedure Date: 2018      Document: [id number]       cc: [facility]

## 2018-09-20 ENCOUNTER — APPOINTMENT (OUTPATIENT)
Dept: OCCUPATIONAL THERAPY | Facility: CLINIC | Age: 53
DRG: 518 | End: 2018-09-20
Attending: ORTHOPAEDIC SURGERY
Payer: COMMERCIAL

## 2018-09-20 ENCOUNTER — APPOINTMENT (OUTPATIENT)
Dept: PHYSICAL THERAPY | Facility: CLINIC | Age: 53
DRG: 518 | End: 2018-09-20
Attending: ORTHOPAEDIC SURGERY
Payer: COMMERCIAL

## 2018-09-20 VITALS
HEIGHT: 76 IN | WEIGHT: 208.9 LBS | TEMPERATURE: 97.7 F | RESPIRATION RATE: 18 BRPM | DIASTOLIC BLOOD PRESSURE: 79 MMHG | OXYGEN SATURATION: 94 % | SYSTOLIC BLOOD PRESSURE: 122 MMHG | BODY MASS INDEX: 25.44 KG/M2

## 2018-09-20 LAB
GLUCOSE BLDC GLUCOMTR-MCNC: 167 MG/DL (ref 70–99)
HGB BLD-MCNC: 14.2 G/DL (ref 13.3–17.7)
POTASSIUM SERPL-SCNC: 3.4 MMOL/L (ref 3.4–5.3)
POTASSIUM SERPL-SCNC: 3.7 MMOL/L (ref 3.4–5.3)

## 2018-09-20 PROCEDURE — 97110 THERAPEUTIC EXERCISES: CPT | Mod: GP

## 2018-09-20 PROCEDURE — 25000132 ZZH RX MED GY IP 250 OP 250 PS 637: Performed by: ORTHOPAEDIC SURGERY

## 2018-09-20 PROCEDURE — 36415 COLL VENOUS BLD VENIPUNCTURE: CPT | Performed by: ORTHOPAEDIC SURGERY

## 2018-09-20 PROCEDURE — 40000133 ZZH STATISTIC OT WARD VISIT: Performed by: OCCUPATIONAL THERAPIST

## 2018-09-20 PROCEDURE — 97535 SELF CARE MNGMENT TRAINING: CPT | Mod: GO | Performed by: OCCUPATIONAL THERAPIST

## 2018-09-20 PROCEDURE — 97116 GAIT TRAINING THERAPY: CPT | Mod: GP

## 2018-09-20 PROCEDURE — 40000193 ZZH STATISTIC PT WARD VISIT

## 2018-09-20 PROCEDURE — 85018 HEMOGLOBIN: CPT | Performed by: ORTHOPAEDIC SURGERY

## 2018-09-20 PROCEDURE — 97165 OT EVAL LOW COMPLEX 30 MIN: CPT | Mod: GO | Performed by: OCCUPATIONAL THERAPIST

## 2018-09-20 PROCEDURE — 00000146 ZZHCL STATISTIC GLUCOSE BY METER IP

## 2018-09-20 PROCEDURE — 84132 ASSAY OF SERUM POTASSIUM: CPT | Performed by: ORTHOPAEDIC SURGERY

## 2018-09-20 RX ORDER — DIAZEPAM 5 MG
5 TABLET ORAL EVERY 8 HOURS PRN
Qty: 20 TABLET | Refills: 0 | Status: SHIPPED | OUTPATIENT
Start: 2018-09-20

## 2018-09-20 RX ORDER — AMOXICILLIN 250 MG
2 CAPSULE ORAL 2 TIMES DAILY
Qty: 40 TABLET | Refills: 0 | Status: SHIPPED | OUTPATIENT
Start: 2018-09-20

## 2018-09-20 RX ORDER — OXYCODONE HYDROCHLORIDE 5 MG/1
5-10 TABLET ORAL EVERY 4 HOURS PRN
Qty: 40 TABLET | Refills: 0 | Status: SHIPPED | OUTPATIENT
Start: 2018-09-20

## 2018-09-20 RX ORDER — OXYCODONE HCL 10 MG/1
10 TABLET, FILM COATED, EXTENDED RELEASE ORAL EVERY 12 HOURS
Qty: 10 TABLET | Refills: 0 | Status: SHIPPED | OUTPATIENT
Start: 2018-09-20

## 2018-09-20 RX ADMIN — Medication 100 MCG: at 09:47

## 2018-09-20 RX ADMIN — CHLORTHALIDONE 25 MG: 25 TABLET ORAL at 09:47

## 2018-09-20 RX ADMIN — FEXOFENADINE HYDROCHLORIDE AND PSEUDOEPHEDRINE HYDROCHLORIDE 1 TABLET: 60; 120 TABLET, FILM COATED, EXTENDED RELEASE ORAL at 09:47

## 2018-09-20 RX ADMIN — DIAZEPAM 5 MG: 5 TABLET ORAL at 17:21

## 2018-09-20 RX ADMIN — OXYCODONE HYDROCHLORIDE 10 MG: 5 TABLET ORAL at 07:55

## 2018-09-20 RX ADMIN — OXYCODONE HYDROCHLORIDE 10 MG: 10 TABLET, FILM COATED, EXTENDED RELEASE ORAL at 07:55

## 2018-09-20 RX ADMIN — Medication 1 TABLET: at 09:48

## 2018-09-20 RX ADMIN — SENNOSIDES AND DOCUSATE SODIUM 1 TABLET: 8.6; 5 TABLET ORAL at 09:49

## 2018-09-20 RX ADMIN — ACETAMINOPHEN 975 MG: 325 TABLET, FILM COATED ORAL at 02:18

## 2018-09-20 RX ADMIN — OXYCODONE HYDROCHLORIDE AND ACETAMINOPHEN 1500 MG: 500 TABLET ORAL at 09:46

## 2018-09-20 RX ADMIN — ACETAMINOPHEN 975 MG: 325 TABLET, FILM COATED ORAL at 09:50

## 2018-09-20 RX ADMIN — OXYCODONE HYDROCHLORIDE 10 MG: 5 TABLET ORAL at 11:22

## 2018-09-20 RX ADMIN — INDOMETHACIN 50 MG: 50 CAPSULE ORAL at 13:43

## 2018-09-20 RX ADMIN — OXYCODONE HYDROCHLORIDE 10 MG: 5 TABLET ORAL at 05:16

## 2018-09-20 RX ADMIN — INDOMETHACIN 50 MG: 50 CAPSULE ORAL at 09:48

## 2018-09-20 RX ADMIN — DIAZEPAM 5 MG: 5 TABLET ORAL at 10:42

## 2018-09-20 RX ADMIN — ESCITALOPRAM 10 MG: 10 TABLET, FILM COATED ORAL at 09:49

## 2018-09-20 RX ADMIN — ATENOLOL 50 MG: 50 TABLET ORAL at 10:03

## 2018-09-20 RX ADMIN — CHOLECALCIFEROL CAP 125 MCG (5000 UNIT) 5000 UNITS: 125 CAP at 09:48

## 2018-09-20 RX ADMIN — GABAPENTIN 300 MG: 300 CAPSULE ORAL at 09:49

## 2018-09-20 RX ADMIN — DIAZEPAM 5 MG: 5 TABLET ORAL at 03:07

## 2018-09-20 RX ADMIN — OXYCODONE HYDROCHLORIDE 10 MG: 5 TABLET ORAL at 15:29

## 2018-09-20 RX ADMIN — OXYCODONE HYDROCHLORIDE 10 MG: 5 TABLET ORAL at 02:18

## 2018-09-20 ASSESSMENT — ACTIVITIES OF DAILY LIVING (ADL)
ADLS_ACUITY_SCORE: 9

## 2018-09-20 NOTE — PLAN OF CARE
Problem: Patient Care Overview  Goal: Plan of Care/Patient Progress Review  Outcome: Improving  A&O. CMS intact. Bowel sounds active, +flatus, -BM. VSS. Dressing changed, anterior incision WDL, posterior incision small amount of drainage at YUDELKA site. Up with SBA. C/o 4-8/10 pain, decreased with OxyContin, oxycodone, and valium. Plan discharge home this evening.

## 2018-09-20 NOTE — PROGRESS NOTES
09/20/18 0830   Quick Adds   Type of Visit Initial Occupational Therapy Evaluation   Living Environment   Lives With spouse;child(nando), dependent   General Information   Onset of Illness/Injury or Date of Surgery - Date 09/18/18   Referring Physician Malcolm Jenkins MD   Patient/Family Goals Statement to go home   Additional Occupational Profile Info/Pertinent History of Current Problem pt s/p c5-C6 anterior cervical disectom with total disc replacement, post C3-6 laminoplasty with instrumentation,  R C5 & C6 foraminotomy   Precautions/Limitations fall precautions   General Observations pt sitting up in chair, agreeable to OT eval   Cognitive Status Examination   Orientation orientation to person, place and time   Level of Consciousness alert   Visual Perception   Visual Perception No deficits were identified   Sensory Examination   Sensory Quick Adds No deficits were identified   Pain Assessment   Patient Currently in Pain Yes, see Vital Sign flowsheet   Integumentary/Edema   Integumentary/Edema no deficits were identifed   Range of Motion (ROM)   ROM Quick Adds No deficits were identified   Strength   Strength Comments not formally tested due to cervical precautions. but appears functional with mobility and ADL's   Muscle Tone Assessment   Muscle Tone Quick Adds No deficits were identified   Coordination   Upper Extremity Coordination No deficits were identified   Mobility   Bed Mobility Comments pt up in chair at start of eval and returned to chair at end of session.   Transfer Skill: Sit to Stand   Level of [place]: Sit/Stand stand-by assist   Transfer Skill: Toilet Transfer   Level of [place]: Toilet stand-by assist   Physical Assist/Nonphysical Assist: Toilet 1 person assist   Assistive Device grab bars   Lower Body Dressing   Level of [place]: Dress Lower Body stand-by assist   Physical Assist/Nonphysical Assist: Dress Lower Body supervision   Toileting   Level of [place]: Toilet stand-by  "assist   Grooming   Level of [place]: Grooming stand-by assist   Physical Assist/Nonphysical Assist: Grooming verbal cues;supervision   Instrumental Activities of Daily Living (IADL)   Previous Responsibilities medication management;shopping;driving;finances;work;   Activities of Daily Living Analysis   Impairments Contributing to Impaired Activities of Daily Living flexibility decreased;pain;post surgical precautions   General Therapy Interventions   Planned Therapy Interventions ADL retraining;transfer training   Clinical Impression   Criteria for Skilled Therapeutic Interventions Met yes, treatment indicated   OT Diagnosis decreased I with ADL's and functional mobility   Influenced by the following impairments increased pain, decreased flexibility   Assessment of Occupational Performance 1-3 Performance Deficits   Identified Performance Deficits decreased I with dressing, toileting, home mgmt   Clinical Decision Making (Complexity) Low complexity   Therapy Frequency other (see comments)  (eval and treat only)   Anticipated Equipment Needs at Discharge bath sponge;shower chair;toileting equipment;raised toilet seat   Anticipated Discharge Disposition Home   Risks and Benefits of Treatment have been explained. Yes   Clinical Impression Comments pt limited  by pain, but otherwise after ADL' education on AE doing well   [facility] [employer] TM \"6 Clicks\"   2016, Trustees of [facility], under license to [employer].  All rights reserved.   6 Clicks Short Forms Daily Activity Inpatient Short Form   [facility] AM-PAC  \"6 Clicks\" Daily Activity Inpatient Short Form   1. Putting on and taking off regular lower body clothing? 4 - None   2. Bathing (including washing, rinsing, drying)? 3 - A Little   3. Toileting, which includes using toilet, bedpan or urinal? 3 - A Little   4. Putting on and taking off regular upper body clothing? 3 - A Little   5. Taking care of personal grooming such as " brushing teeth? 4 - None   6. Eating meals? 4 - None   Daily Activity Raw Score (Score out of 24.Lower scores equate to lower levels of function) 21   Total Evaluation Time   Total Evaluation Time (Minutes) 15

## 2018-09-20 NOTE — PLAN OF CARE
Problem: Patient Care Overview  Goal: Plan of Care/Patient Progress Review  Outcome: Therapy, progress toward functional goals as expected  Discharge Planner OT   Patient plan for discharge: home with assist from wife for IADLs  Current status: pt at baseline lives at home with wife and 3 kids. He lives in a 2 story house and has a walk in shower. He demo's SBA with toilet transfer and LE dressing. Recommended reacher, toilet tongs, raised toilet seat, shower chair, long handled sponge and hand held shower. Pt provided with recommendations and handout for where to purchase.   Barriers to return to prior living situation: bed and bath not on main level  Recommendations for discharge: home, wife to assist with meals as needed  Rationale for recommendations: pt has supportive family and is motivated to return to I baseline       Entered by: Sarah Ahumada 09/20/2018 11:58 AM

## 2018-09-20 NOTE — PROGRESS NOTES
[place] - Suicide Severity Rating Scale Completed? Yes  If yes, what color did the patient score? White

## 2018-09-20 NOTE — PROGRESS NOTES
Patient and parents given discharge instructions, questions encouraged and answered. Patient left unit via wheelchair with all belongings and medications to meet ride home at 1733.

## 2018-09-20 NOTE — PLAN OF CARE
Problem: Patient Care Overview  Goal: Plan of Care/Patient Progress Review  Outcome: Improving  A&Ox4. CMS intact. VSS on RA.  DD3 diet. Bowel sounds hyper active, no BM, + flatus. Soft collar in place at all times. Voiding adequately. Dressing, CDI. Anterior YUDELKA patent with minimal output. SBA, Ambulated x 3. Neck pain, decreased with Oxycodone x2, ice pack and Valium x1. Plan to see OT and possible discharge to home.

## 2018-09-20 NOTE — DISCHARGE INSTRUCTIONS
INSTRUCTIONS FOR CERVICAL SURGICAL PATIENTS  ROSS JENKINS MD--[facility]    POSTOPERATIVE INSTRUCTIONS (AFTER SURGERY):     PATIENTS WITH A SOFT COLLAR:    1. The collar must be worn at all times for the 6 weeks after your surgery until your first follow up appointment with Dr. Jenkins. You may only remove it to shower however, be sure to avoid any excessive movement of your head and neck.      PATIENTS WITH A CUSTOM NECK BRACE:  1. The brace must be worn at all times for 3 months after surgery. In most cases you can remove it to shower. Dr. Jenkins will tell you if you cannot.     2. Check the straps on the brace daily to make sure the lines drawn on the straps are where they belong and the brace is fitting properly.    3. Regarding any questions, concerns or needed adjustments with the brace please call [facility], [phone], and speak directly to the orthotist that fit the brace. If you are having difficulty reaching them please call [facility] at [phone].    4. This brace can be cumbersome to wear we encourage you to practice wearing it with all activities BEFORE your surgery. This will allow you to get used to how it will feel and make any modifications at home or work if need be.     INCISION/WOUND CARE                1.   If you are discharged from the hospital with a dressing over your incision you may remove and replace it 2 days after leaving the hospital.     2.   If you have sutures that dissolve, the incision must be covered when showering for 5 days after surgery. On the 6th day, you may take a shower normally without covering the wound. But do not scrub the wound.     3.  The small pieces of tape over your incision are called Steri-strips, they can be   removed when they are loose or after 2 weeks.                                                                           4.    If you have staples, your incision must remain dry and covered until they are removed 2  weeks after your surgery. Please call Dr. Gregory Barron s Care Coordinator at [phone] to make an appointment to have these removed when you have returned home from the hospital.    5. Please look at your incisions daily and call [phone] immediately if you notice any redness, swelling, drainage, or if you develop a fever greater than 101. If after hours or weekends call [phone] and speak to the on-call physician.    6. Absolutely no bathtubs, hot tubs, swimming in pools or lakes, or any submersion/soaking before the incision is completely healed (no open areas or scabs are present).    7. Due to the location of the incision and surgery you may experience swelling that can alter your swallowing if you are having any difficulty at all please contact us immediately [phone] or after hours or weekends call [phone] and speak to the on-call physician.    POST OPERATIVE ACTIVITY LIMITATIONS    1. Avoid extreme motions of your head and neck which will be limited/controlled by the collar. Prior to surgery we encourage you to stock straws for drinking and soft foods to eat post-operatively as eating/drinking will be temporarily effected by both post surgical swelling and the collar.     2. No lifting over 10 pounds (gallon of milk) above your chest or below your waist.     3. Avoid repetitive twisting or bending i.e. raking, sweeping, shoveling etc.    4. Walking stimulates the healing process. Dr. Jenkins wants you to accomplish a minimum of 45 minutes of sustained walking per day for exercise. You are encouraged to walk several times a day and there is no limit on how far you can walk. In the beginning you may only be able to walk 5-15 minutes at a time that is okay just do this a minimum of 4-10x/day.    5. You may remove your white stockings (joao hose) for   hour twice a day. You may discontinue wearing the stockings when you are not spending any time in bed during the day and are walking at least  hourly.    6. You may begin driving again when you are no longer taking the narcotic pain medication and feel comfortable with being able to navigate amongst other drivers. You must also wear the collar that you have been provided while driving.    7. You may return to work when you are no longer taking the narcotic pain medication. You must observe the 10-pound lifting restriction (nothing below waist or above chest), frequent change of position from sit, stand, and walking and avoid repetitive bending and twisting. You must also wear the collar that you have been provided.    8. Advancement of physical activities will be discussed at each follow up appointment with Dr. Jenkins. Physical therapy, if needed, will also be discussed at each appointment.    9. Gentle sexual activity is okay. Be a passive participant.    POST OPERATIVE MEDICATIONS    1. If your surgery INCLUDED A FUSION  DO NOT take Ibuprofen, Motrin, Advil, Aleve or any other anti-inflammatory medication or aspirin products for 3 months after surgery. This also includes any medication taken for chronic inflammatory conditions i.e. Methotrexate, Remicaid, Prednisone etc. unless otherwise instructed. Only Tylenol or Tylenol based medications are okay during this time frame.    2. If your surgery DID NOT include a fusion you may resume any over-the-counter     anti-inflammatories (Advil, Ibuprofen, Naproxen etc.) 3-5 days after the surgery.    3. We recommend three 8 ounce glasses of milk daily and a daily supplement of Vitamin D 2000 i.u./day for fusion healing and bone growth.    4. Poor nutrition can lead to delayed wound healing and constipation. Good sources of lean proteins and fresh fruits and vegetables will help with this.     5. Narcotic pain medications will also cause constipation. Using over the counter stool softeners, drinking plenty of fluids, ambulation, and good nutrition can help prevent this from occurring.     If you have any questions  regarding these instructions please contact Dr. Jenkins at   [phone].

## 2018-09-20 NOTE — PLAN OF CARE
Problem: Patient Care Overview  Goal: Plan of Care/Patient Progress Review  Discharge Planner PT   Patient plan for discharge: home  Current status: Pt doing well with all mobility. IND with transfers, ambulation 300ft and mod I with 9 stairs with 1 railing.  Demos good body mechanics with standing sinkside tasks and standing toileting. Aware of spinal precautions.  Reviewed Instruction with handout of  pt  UE AROM program and AP, QS as ordered. 8/10 pain in neck at end of session. Pt denies any further questions or mobility concerns. All PT goals met. Therefore plan to discharge pt for acute PT.   Barriers to return to prior living situation: pain  Recommendations for discharge: home with family assist for household tasks.   Rationale for recommendations: Pt is mobilizing well. Anticipate home at hospital disch with UE AROM and ambulation home programs.        Entered by: Yane Pierre 09/20/2018 8:26 AM         Physical Therapy Discharge Summary    Reason for therapy discharge:    All goals and outcomes met, no further needs identified.    Progress towards therapy goal(s). See goals on Care Plan in [facility] electronic health record for goal details.  Goals met    Therapy recommendation(s):    No further therapy is recommended.  Continue home exercise program. for LE and UE ROM as well as ambulation program.

## 2018-09-20 NOTE — PROGRESS NOTES
History:   Minimal c/o.  Pain is adequately controlled on OxyContin, oxycodone and Valium.  Tolerating Soft diet denies swallowing difficulties..  Preop symptoms in both upper and lower extremities improved.  Ambulating in hallway with standby assistance.  Vitals:   B/P: 117/76, T: 97.7, P: Data Unavailable, R: 16    Intake/Output Summary (Last 24 hours) at 09/20/18 0813  Last data filed at 09/20/18 0656   Gross per 24 hour   Intake             1380 ml   Output             1900 ml   Net             -520 ml          Lab Results   Component Value Date     09/19/2018     09/12/2008     09/04/2008    Lab Results   Component Value Date    CHLORIDE 96 09/19/2018    CHLORIDE 101 09/12/2008    CHLORIDE 104 09/04/2008    Lab Results   Component Value Date    BUN 7 09/19/2018    BUN 14 09/12/2008    BUN 15 09/04/2008      Lab Results   Component Value Date    POTASSIUM 3.4 09/20/2018    POTASSIUM 3.3 09/19/2018    POTASSIUM 3.3 09/19/2018    Lab Results   Component Value Date    CO2 34 09/19/2018    CO2 28 09/12/2008    CO2 28 09/04/2008    Lab Results   Component Value Date    CR 0.97 09/19/2018    CR 1.03 09/18/2018    CR 0.85 09/12/2008        Lab Results   Component Value Date    WBC 10.2 09/12/2008    WBC 5.7 09/05/2008    WBC 6.2 09/04/2008    HGB 13.5 09/19/2018    HGB 13.9 09/12/2008    HGB 13.1 (L) 09/05/2008    HCT 40.4 09/12/2008    HCT 37.3 (L) 09/05/2008    HCT 35.2 (L) 09/04/2008    MCV 95 09/12/2008    MCV 95 09/05/2008    MCV 93 09/04/2008     09/12/2008     09/05/2008     09/04/2008        Results for orders placed or performed during the hospital encounter of 09/18/18 (from the past 24 hour(s))   Potassium   Result Value Ref Range    Potassium 3.3 (L) 3.4 - 5.3 mmol/L   CT Cervical Spine w/o Contrast    Narrative    CT CERVICAL SPINE WITHOUT CONTRAST   9/19/2018 7:46 PM     HISTORY: Status post laminoplasty C4-C6 and disc replacement at C5-C6;  evaluate  decompression  and position of implants.      TECHNIQUE: Axial images of the cervical spine were obtained without  intravenous contrast. Multiplanar reformations were performed.  Radiation dose for this scan was reduced using automated exposure  control, adjustment of the mA and/or kV according to patient size, or  iterative reconstruction technique.    COMPARISON: No preoperative cross-sectional imaging is available for  comparison at the time of this dictation.    FINDINGS: There are new postoperative changes within the cervical  spine. There is an intervertebral disc spacer at the C5-C6 level. The  most anterior aspect of this hardware extends slightly anterior to the  anterior vertebral body line.    There are new postoperative changes of right-sided laminectomies with  connecting surgical hardware at the C4, C5, and C6 levels. Hardware  appears intact on the provided images. There is a surgical drain along  the posterior right subcutaneous tissues terminating at the level of  C5. Marked edema within the postoperative bed. There is also  prevertebral edema.    No definite loss of vertebral body height. Cervical spine alignment  appears to be within normal limits. Mild loss of intervertebral disc  space and degenerative changes at C3-C4 and C4-C5.     Level by level as follows:    C2-C3: Asymmetric right foraminal uncinate spurring and disc  protrusion results in moderate right neural foraminal narrowing. No  significant spinal canal or left neural foraminal narrowing.     C3-C4: Posterior disc bulge with uncinate spurring asymmetric in the  right foraminal region results in moderate right and mild left neural  foraminal narrowing. There is mild central spinal canal narrowing.     C4-C5: Postoperative changes of right-sided laminectomy. There does  not appear to be significant spinal canal narrowing. Mild disc bulge  and uncinate spurring results in mild bilateral neural foraminal  narrowing.     C5-C6: Postoperative changes of  right-sided laminectomy and placement  of intervertebral disc spacer. No significant spinal canal narrowing.  Left greater than right uncinate spurring results in moderate left and  mild right neural foraminal narrowing.      C6-C7: Postoperative changes of right-sided laminectomy. Posterior  disc bulge and endplate osteophytic spurring results in moderate left  and mild right neural foraminal narrowing. No spinal canal narrowing.     C7-T1: No significant spinal canal or neural foraminal narrowing.     Marked prevertebral soft tissue swelling. The left thyroid superior  cornu appears to be superiorly and medially displaced when compared to  the right. This is age indeterminate.      Impression    IMPRESSION:   1. Recent postoperative changes in the cervical spine as described  above. Surgical hardware appears intact and well seated. Marked edema  within the post operative bed.  2. There does not appear to be significant residual spinal canal  narrowing at C4-C5, C5-C6, or C6-C7. No preoperative imaging is  available for comparison.  3. The left superior thyroid cornu appears to be superiorly and  medially displaced. This is age indeterminate.  4. Degenerative changes throughout the cervical spine as described  above.      VENICE RICO MD   Potassium   Result Value Ref Range    Potassium 3.4 3.4 - 5.3 mmol/L   Glucose by meter   Result Value Ref Range    Glucose 167 (H) 70 - 99 mg/dL     YUDELKA output minimal since MN.  Dressing:   Dry and intact. Brace/collar intact and well fitted.   Neuro:   Motor: 5/5   Sensation: intact      A/P:   Stable POD # 2   Home today on OxyContin, oxycodone and Valium.

## 2018-09-21 NOTE — PLAN OF CARE
Problem: Patient Care Overview  Goal: Plan of Care/Patient Progress Review  Occupational Therapy Discharge Summary    Reason for therapy discharge:    Discharged to home.    Progress towards therapy goal(s). See goals on Care Plan in [facility] electronic health record for goal details.  Goals partially met.  Barriers to achieving goals:   discharge from facility.    Therapy recommendation(s):    Continue home exercise program.

## 2018-09-23 ENCOUNTER — NURSE TRIAGE (OUTPATIENT)
Dept: NURSING | Facility: CLINIC | Age: 53
End: 2018-09-23

## 2018-09-23 NOTE — TELEPHONE ENCOUNTER
Had surgery 9-18-18, discharged 9-20-18.  Will be out of pain medications by tomorrow morning.  Wondering how he can get additional refills?    Patient's surgeon is from [facility].  Primary care is through [facility].  Denies fever.    Patient denies further triage, states he really just needs pain medications.    Protocol and care advice reviewed  Caller states understanding of the recommended disposition.  Advised patient to call his surgeon at [facility] to discuss pain control.    Advised to call back if further questions or concerns      Reason for Disposition    Caller requesting a NON-URGENT new prescription or refill and triager unable to refill per unit policy    Protocols used: MEDICATION QUESTION CALL-ADULT-

## 2018-09-24 NOTE — DISCHARGE SUMMARY
Admit Date:     09/18/2018   Discharge Date:     09/20/2018      DIAGNOSIS:  Cervical myelopathy with right C6 radiculopathy.      PROCEDURE THIS ADMISSION:  C5 to 6 anterior cervical diskectomy and total disk replacement using the Ismone disk and C4 to C6 laminoplasties with instrumentation.      HISTORY OF PRESENT ILLNESS:  Mr. Dill is a 52-year-old man who has congenital cervical spinal stenosis.  He was asymptomatic until 2 or 3 years ago at which time he developed interscapular pain and 3 months ago he developed bilateral arm and leg numbness and tingling.  His symptoms of numbness and tingling were much worse on the right arm than elsewhere.  His preoperative MRI scan revealed central stenosis from the C3 to 4 down to C6 to 7 with a central disk herniation at the C5 to 6 level causing severe central stenosis.  It was felt that likely source of his numbness was a central disk herniation C5 to 6 level, and a discussion was had about simply doing a C5 to 6 anterior cervical diskectomy and total disk replacement versus that procedure along with prophylactic laminoplasty under the same anesthetic.  The patient elected to do both procedures.      HOSPITAL COURSE:  As follows:  On the day of admission, he was taken to the operating room where he underwent the previously mentioned procedure.  The procedure was accomplished without difficulty and was returned to regular hospital pedro postoperatively.  During the procedure, motor evoked potential and somatosensory evoked potential monitoring was used, and there were no changes in his neurologic function from baseline during the procedure.  His postoperative course was remarkable for some problems with pain control.  Eventually, his pain was controlled on 10 mg of OxyContin q.12 hours with 10 mg of oxycodone every 3 hours, valium 5 mg every 6 hours.  He also had mild hypokalemia during this admission.  His level got down to 3.3.  This was corrected using the potassium  replacement protocol.  At the time of discharge, his potassium level was 3.7.  His hemoglobin was 14.2.  He received no blood products during this admission.  His hospital course was otherwise unremarkable.  He made good progress in eating, voiding and ambulating, was accomplishing these activities without difficulty at time of discharge.  At the time of discharge, he was wearing a soft cervical collar for comfort and was independent in his cares.        He was discharged to home on postoperative day #2 with the following medications:  Oxycodone 5 mg 1-2 tablets p.o. q.4 hours p.r.n. for pain.  OxyContin 10 mg 1 tablet p.o. q.12 hours, he was given 10 tablets, valium 5 mg p.o. q.8 hours p.r.n., he was given 10 tablets.        He was instructed to resume his preoperative medications including ascorbic acid 500 mg 3 tablets p.o. each day, Tenoretic 50/25 mg 1 tablet p.o. each day, calcium carbonate plus vitamin D 600/400 one tablet p.o. twice a day, vitamin B12 one tablet p.o. each day, Lexapro 10 mg p.o. each day, Allegra D 1 tablet p.o. each day, gabapentin 300 mg p.o. 3 times a day, Nasonex nasal spray 50 mcg/ACT 2 sprays into both nostrils at bedtime, zinc 3 tablets p.o. each day, multivitamin 1 tablet p.o. each day, Mirapex 0.25 mg p.o. each day at bedtime p.r.n, Crestor 40 mg p.o. daily at bedtime, senna S 2 tablets p.o. twice a day while taking narcotic medication, Cialis 5 mg p.o. each day p.r.n., Depo testosterone 50 mg p.o. every 14 days and vitamin D3 at 5000 international units p.o. each day.      He will follow up in my clinic 1 month from the time of discharge.  He was instructed to avoid bending and twisting his neck and to wear his soft cervical collar until that followup appointment.         ROSS SHERIDAN MD             D: 2018   T: 2018   MT: JUDY      Name:     JIMMY LOPEZ   MRN:      [id number]        Account:        [id number]   :      1965           Admit Date:      09/18/2018                                  Discharge Date: 09/20/2018      Document: [id number]       cc: Carlos Leonard MD

## 2018-09-26 ENCOUNTER — DOCUMENTATION ONLY (OUTPATIENT)
Dept: PHARMACY | Facility: CLINIC | Age: 53
End: 2018-09-26

## 2018-09-26 NOTE — PROGRESS NOTES
Prior Authorization  Name of drug: Oxycodone IR tab  Date Initiated: 9/21/18  Date Completed: 9/21/18  Prior Auth Type: Clinical     Status: Approved    Effective Date: 9/21/18-10/21/18  Copay: $0  [place] Filled: Yes  Insurance: [employer]  Case Number: [id number]  Submitted Via: [facility]  [facility] Liaison  Liaison Cell: [phone]

## 2018-09-26 NOTE — PROGRESS NOTES
Prior Authorization  Name of drug: Oxycodone ER 12HR tab  Date Initiated: 9/21/18  Date Completed: 9/21/18  Prior Auth Type: Clinical     Status: Approved    Effective Date: 8/21/18-9/21/19  Copay: $0  [place] Filled: Yes  Insurance:  Commercial  Case Number: [id number]  Submitted Via: [facility]  [facility] Liaison  Liaison Cell: [phone]    '

## 2019-11-09 ENCOUNTER — HEALTH MAINTENANCE LETTER (OUTPATIENT)
Age: 54
End: 2019-11-09

## 2020-05-27 ENCOUNTER — TELEPHONE (OUTPATIENT)
Dept: OTHER | Facility: CLINIC | Age: 55
End: 2020-05-27

## 2020-05-27 DIAGNOSIS — E78.2 MIXED HYPERLIPIDEMIA: ICD-10-CM

## 2020-05-27 DIAGNOSIS — R68.89 ABNORMAL NECK FINDING: Primary | ICD-10-CM

## 2020-05-27 DIAGNOSIS — I10 BENIGN ESSENTIAL HYPERTENSION: ICD-10-CM

## 2020-05-27 NOTE — TELEPHONE ENCOUNTER
[facility]    Who is the name of the provider?:  New Referral      What is the location you see this provider at?: [place]    Reason for call:  Referred by Dr. ENRIQUETA Jenkins for bulge in neck    Can we leave a detailed message on this number?  YES

## 2020-05-28 NOTE — TELEPHONE ENCOUNTER
BENEDICT Nichols to schedule US and visit with any medicine dr.  US order will need to be signed by  Appointment with.       Iza LUIS

## 2020-05-28 NOTE — TELEPHONE ENCOUNTER
Simone called on 5/28/2020 stating that he has an US scheduled with [facility] and will have that available for Dr. Wright.     No US scheduled.     Simone is scheduled for New Consult visit with Dr. Wright Lyndsey 3, 2020.    Iza LUIS

## 2020-05-29 NOTE — TELEPHONE ENCOUNTER
May 29, 2020    Patient called [facility] and stated the results of his US have come back negative for any vascular abnormalities or concerns and as a result of this he has chosen to cancel his New Patient Consult appointment with [facility] on 6/3/2020.     Appt has been cancelled and documented.     Radha Stanley    [facility][note 3]  Office: [phone]  Fax [phone]

## 2020-12-06 ENCOUNTER — HEALTH MAINTENANCE LETTER (OUTPATIENT)
Age: 55
End: 2020-12-06

## 2021-09-26 ENCOUNTER — HEALTH MAINTENANCE LETTER (OUTPATIENT)
Age: 56
End: 2021-09-26

## 2022-01-15 ENCOUNTER — HEALTH MAINTENANCE LETTER (OUTPATIENT)
Age: 57
End: 2022-01-15

## 2023-02-03 ENCOUNTER — HOSPITAL ENCOUNTER (EMERGENCY)
Facility: CLINIC | Age: 58
Discharge: HOME | End: 2023-02-04
Attending: EMERGENCY MEDICINE | Admitting: EMERGENCY MEDICINE
Payer: COMMERCIAL

## 2023-02-03 VITALS
DIASTOLIC BLOOD PRESSURE: 87 MMHG | OXYGEN SATURATION: 97 % | WEIGHT: 240 LBS | RESPIRATION RATE: 16 BRPM | TEMPERATURE: 97.3 F | HEART RATE: 85 BPM | BODY MASS INDEX: 29.22 KG/M2 | SYSTOLIC BLOOD PRESSURE: 160 MMHG | HEIGHT: 76 IN

## 2023-02-03 DIAGNOSIS — M54.50 CHRONIC BILATERAL LOW BACK PAIN WITHOUT SCIATICA: ICD-10-CM

## 2023-02-03 DIAGNOSIS — G89.29 CHRONIC BILATERAL LOW BACK PAIN WITHOUT SCIATICA: ICD-10-CM

## 2023-02-03 PROCEDURE — 96374 THER/PROPH/DIAG INJ IV PUSH: CPT

## 2023-02-03 PROCEDURE — 99284 EMERGENCY DEPT VISIT MOD MDM: CPT | Mod: 25

## 2023-02-04 LAB
ALBUMIN SERPL BCG-MCNC: 4 G/DL (ref 3.5–5.2)
ALP SERPL-CCNC: 72 U/L (ref 40–129)
ALT SERPL W P-5'-P-CCNC: 32 U/L (ref 10–50)
ANION GAP SERPL CALCULATED.3IONS-SCNC: 11 MMOL/L (ref 7–15)
AST SERPL W P-5'-P-CCNC: 28 U/L (ref 10–50)
BASOPHILS # BLD AUTO: 0.1 10E3/UL (ref 0–0.2)
BASOPHILS NFR BLD AUTO: 1 %
BILIRUB SERPL-MCNC: 0.7 MG/DL
BUN SERPL-MCNC: 9.3 MG/DL (ref 6–20)
CALCIUM SERPL-MCNC: 8.7 MG/DL (ref 8.6–10)
CHLORIDE SERPL-SCNC: 94 MMOL/L (ref 98–107)
CREAT SERPL-MCNC: 0.98 MG/DL (ref 0.67–1.17)
DEPRECATED HCO3 PLAS-SCNC: 31 MMOL/L (ref 22–29)
EOSINOPHIL # BLD AUTO: 0.3 10E3/UL (ref 0–0.7)
EOSINOPHIL NFR BLD AUTO: 4 %
ERYTHROCYTE [DISTWIDTH] IN BLOOD BY AUTOMATED COUNT: 12.4 % (ref 10–15)
GFR SERPL CREATININE-BSD FRML MDRD: >90 ML/MIN/1.73M2
GLUCOSE SERPL-MCNC: 108 MG/DL (ref 70–99)
HCT VFR BLD AUTO: 39.9 % (ref 40–53)
HGB BLD-MCNC: 14.1 G/DL (ref 13.3–17.7)
IMM GRANULOCYTES # BLD: 0.1 10E3/UL
IMM GRANULOCYTES NFR BLD: 1 %
LYMPHOCYTES # BLD AUTO: 2 10E3/UL (ref 0.8–5.3)
LYMPHOCYTES NFR BLD AUTO: 25 %
MCH RBC QN AUTO: 31.8 PG (ref 26.5–33)
MCHC RBC AUTO-ENTMCNC: 35.3 G/DL (ref 31.5–36.5)
MCV RBC AUTO: 90 FL (ref 78–100)
MONOCYTES # BLD AUTO: 1.3 10E3/UL (ref 0–1.3)
MONOCYTES NFR BLD AUTO: 16 %
NEUTROPHILS # BLD AUTO: 4.3 10E3/UL (ref 1.6–8.3)
NEUTROPHILS NFR BLD AUTO: 53 %
NRBC # BLD AUTO: 0 10E3/UL
NRBC BLD AUTO-RTO: 0 /100
PLATELET # BLD AUTO: 246 10E3/UL (ref 150–450)
POTASSIUM SERPL-SCNC: 3.2 MMOL/L (ref 3.4–5.3)
PROT SERPL-MCNC: 6.1 G/DL (ref 6.4–8.3)
RBC # BLD AUTO: 4.43 10E6/UL (ref 4.4–5.9)
SODIUM SERPL-SCNC: 136 MMOL/L (ref 136–145)
WBC # BLD AUTO: 8 10E3/UL (ref 4–11)

## 2023-02-04 PROCEDURE — 80053 COMPREHEN METABOLIC PANEL: CPT | Performed by: EMERGENCY MEDICINE

## 2023-02-04 PROCEDURE — 250N000011 HC RX IP 250 OP 636: Performed by: EMERGENCY MEDICINE

## 2023-02-04 PROCEDURE — 85025 COMPLETE CBC W/AUTO DIFF WBC: CPT | Performed by: EMERGENCY MEDICINE

## 2023-02-04 PROCEDURE — 36415 COLL VENOUS BLD VENIPUNCTURE: CPT | Performed by: EMERGENCY MEDICINE

## 2023-02-04 RX ORDER — METHYLPREDNISOLONE 4 MG
TABLET, DOSE PACK ORAL
Qty: 21 TABLET | Refills: 0 | Status: SHIPPED | OUTPATIENT
Start: 2023-02-04

## 2023-02-04 RX ORDER — CYCLOBENZAPRINE HCL 10 MG
10 TABLET ORAL 3 TIMES DAILY PRN
Qty: 20 TABLET | Refills: 0 | Status: SHIPPED | OUTPATIENT
Start: 2023-02-04 | End: 2023-02-11

## 2023-02-04 RX ORDER — KETOROLAC TROMETHAMINE 15 MG/ML
15 INJECTION, SOLUTION INTRAMUSCULAR; INTRAVENOUS ONCE
Status: COMPLETED | OUTPATIENT
Start: 2023-02-04 | End: 2023-02-04

## 2023-02-04 RX ADMIN — KETOROLAC TROMETHAMINE 15 MG: 15 INJECTION, SOLUTION INTRAMUSCULAR; INTRAVENOUS at 00:49

## 2023-02-04 ASSESSMENT — ENCOUNTER SYMPTOMS
NUMBNESS: 0
BACK PAIN: 1
MYALGIAS: 1

## 2023-02-04 ASSESSMENT — ACTIVITIES OF DAILY LIVING (ADL)
ADLS_ACUITY_SCORE: 35
ADLS_ACUITY_SCORE: 35

## 2023-02-04 NOTE — ED PROVIDER NOTES
History     Chief Complaint:  Leg Pain       The history is provided by the patient.      Simone Dill is a 57 year old male with a history of hypertension, hyperlipidemia, and neuropathy who presents with bilateral leg pain. He reports pain from the knees down, which he thinks was exacerbated after sitting in a chair at work yesterday. The patient reports pain waking him from sleep at 0130 last night and has not been able to sleep or stand since that time due to pain. The patient reports pain like this in the past, for which he has had x-rays and steroid injections. He reports sensation changes in the lower legs, but not into the thighs. He denies sensation changes with urination. The patient confirms that walking has been normal for him. He notes some soreness in the back as well, mentioning history of stenosis and past laminectomy. The patient takes 6x 300 mg gabapentin each day. The patient also mentions surgery for sleep apnea, followed by norovirus infection in the past month. He notes recent MRI in October, but not while symptoms were present.     Independent Historian:   None - Patient Only    ROS:  Review of Systems   Musculoskeletal: Positive for back pain and myalgias. Negative for gait problem.   Neurological: Negative for numbness.        (+) paresthesias    All other systems reviewed and are negative.  10 point review of systems performed and is negative except as above and in HPI.     Allergies:  Augmentin  Erythromycin  Sulfa Drugs     Medications:    Tenoretic  Valium  Lexapro  Gabapentin  Mirapex  Crestor  Tadalafil  Depotestosterone    Past Medical History:    Anxiety  Hypercholesterolemia  Hypertension  Cervical myelopathy  Testosterone deficiency  Peripheral sensory neuropathy  Right lateral epicondylitis   Varicella   Sleep apnea     Past Surgical History:    Discectomy, fusion cervical anterior one level  ENT surgery  Hernia repair x2  Laminectomy  Replace disk cervical anterior  "  Tonsillectomy & adenoidectomy     Social History:  The patient presents alone.   PCP: Javier Leonard     Physical Exam     Patient Vitals for the past 24 hrs:   BP Temp Temp src Pulse Resp SpO2 Height Weight   02/03/23 2334 (!) 160/87 97.3  F (36.3  C) Temporal 85 16 97 % 1.93 m (6' 4\") 108.9 kg (240 lb)        Physical Exam  General: Resting on the gurney, appears minimally uncomfortable  Head:  The scalp, face, and head appear normal  Mouth/Throat: Mucus membranes are moist  CV:  Regular rate    Normal S1 and S2  No pathological murmur   Resp:  Breath sounds clear and equal bilaterally    Non-labored, no retractions or accessory muscle use    No coarseness    No wheezing   GI:  Abdomen is soft, no rigidity    No tenderness to palpation  MS:  Normal motor assessment of all extremities.    Good capillary refill noted.  Skin:  No rash or lesions noted.  Neuro:  Bilateral lower extremity paresthesias in lateral aspect of the bilateral legs    No medial thigh paresthesias    Normal strength  Psych: Awake. Alert.  Normal affect.      Appropriate interactions.       Emergency Department Course     Laboratory:  Labs Ordered and Resulted from Time of ED Arrival to Time of ED Departure   COMPREHENSIVE METABOLIC PANEL - Abnormal       Result Value    Sodium 136      Potassium 3.2 (*)     Chloride 94 (*)     Carbon Dioxide (CO2) 31 (*)     Anion Gap 11      Urea Nitrogen 9.3      Creatinine 0.98      Calcium 8.7      Glucose 108 (*)     Alkaline Phosphatase 72      AST 28      ALT 32      Protein Total 6.1 (*)     Albumin 4.0      Bilirubin Total 0.7      GFR Estimate >90     CBC WITH PLATELETS AND DIFFERENTIAL - Abnormal    WBC Count 8.0      RBC Count 4.43      Hemoglobin 14.1      Hematocrit 39.9 (*)     MCV 90      MCH 31.8      MCHC 35.3      RDW 12.4      Platelet Count 246      % Neutrophils 53      % Lymphocytes 25      % Monocytes 16      % Eosinophils 4      % Basophils 1      % Immature Granulocytes 1   "    NRBCs per 100 WBC 0      Absolute Neutrophils 4.3      Absolute Lymphocytes 2.0      Absolute Monocytes 1.3      Absolute Eosinophils 0.3      Absolute Basophils 0.1      Absolute Immature Granulocytes 0.1      Absolute NRBCs 0.0          Emergency Department Course & Assessments:       Interventions:  Medications   ketorolac (TORADOL) injection 15 mg (15 mg Intravenous Given 2/4/23 0049)      Assessments:  0015 I obtained history and examined the patient as noted above.   0349 I rechecked and updated the patient. At this point I feel that the patient is safe for discharge, and the patient agrees.     Disposition:  The patient was discharged to home.     Impression & Plan      Medical Decision Making:  Simone Dill is a 57 year old male who presents for leg paresthesias and back pain.  MRI was ordered, but unable to be completed today due to medical device limitations.  I discussed this with the patient, and recommended discussing this with his primary doctor to try to schedule MRI in the upcoming week.  There are no red flag symptoms to suggest acute neurologic emergency, acute spinal cord or nerve root compression or cauda equina syndrome.  I do not suspect referred pain from an intraabdominal or urologic process.  The patient was provided with pain control.  The patient was given return precautions including uncontrolled pain, loss of bowl or bladder control, fever or new onset weakness.  All questions answered and the patient was discharged home in stable condition.     Diagnosis:    ICD-10-CM    1. Chronic bilateral low back pain without sciatica  M54.50     G89.29            Discharge Medications:  Discharge Medication List as of 2/4/2023  4:04 AM      START taking these medications    Details   cyclobenzaprine (FLEXERIL) 10 MG tablet Take 1 tablet (10 mg) by mouth 3 times daily as needed for muscle spasms, Disp-20 tablet, R-0, E-Prescribe      methylPREDNISolone (MEDROL DOSEPAK) 4 MG tablet  therapy pack Follow Package Directions, Disp-21 tablet, R-0, E-Prescribe              Scribe Disclosure:  I, Zeynep Handk, am serving as a scribe at 12:11 AM on 2/4/2023 to document services personally performed by Gita Duggan MD based on my observations and the provider's statements to me.     2/4/2023   Gita Duggan MD Debroux, Karah M, MD  02/04/23 0545

## 2023-02-04 NOTE — ED TRIAGE NOTES
Patient here with bilateral leg pain which started yesterday.     Triage Assessment     Row Name 02/03/23 0288       Triage Assessment (Adult)    Airway WDL WDL       Respiratory WDL    Respiratory WDL WDL       Skin Circulation/Temperature WDL    Skin Circulation/Temperature WDL WDL       Cardiac WDL    Cardiac WDL WDL       Peripheral/Neurovascular WDL    Peripheral Neurovascular WDL WDL       Cognitive/Neuro/Behavioral WDL    Cognitive/Neuro/Behavioral WDL WDL

## 2023-02-04 NOTE — DISCHARGE INSTRUCTIONS
Discharge Instructions  Back Pain  You were seen today for back pain. Back pain can have many causes, but most will get better without surgery or other specific treatment. Sometimes there is a herniated ( slipped ) disc. We do not usually do MRI scans to look for these right away, since most herniated discs will get better on their own with time.  Today, we did not find any evidence that your back pain was caused by a serious condition. However, sometimes symptoms develop over time and cannot be found during an emergency visit, so it is very important that you follow up with your primary provider.  Generally, every Emergency Department visit should have a follow-up clinic visit with either a primary or a specialty clinic/provider. Please follow-up as instructed by your emergency provider today.    Return to the Emergency Department if:  You develop a fever with your back pain.   You have weakness or change in sensation in one or both legs.  You lose control of your bowels or bladder, or cannot empty your bladder (cannot pee).  Your pain gets much worse.     Follow-up with your provider:  Unless your pain has completely gone away, please make an appointment with your provider within one week. Most of the routine care for back pain is available in a clinic and not the Emergency Department. You may need further management of your back pain, such as more pain medication, imaging such as an X-ray or MRI, or physical therapy.    What can I do to help myself?  Remain Active -- People are often afraid that they will hurt their back further or delay recovery by remaining active, but this is one of the best things you can do for your back. In fact, staying in bed for a long time to rest is not recommended. Studies have shown that people with low back pain recover faster when they remain active. Movement helps to bring blood flow to the muscles and relieve muscle spasms as well as preventing loss of muscle strength.  Heat --  Using a heating pad can help with low back pain during the first few weeks. Do not sleep with a heating pad, as you can be burned.   Pain medications - You may take a pain medication such as Tylenol  (acetaminophen), Advil , Motrin  (ibuprofen) or Aleve  (naproxen).  If you were given a prescription for medicine here today, be sure to read all of the information (including the package insert) that comes with your prescription.  This will include important information about the medicine, its side effects, and any warnings that you need to know about.  The pharmacist who fills the prescription can provide more information and answer questions you may have about the medicine.  If you have questions or concerns that the pharmacist cannot address, please call or return to the Emergency Department.   Remember that you can always come back to the Emergency Department if you are not able to see your regular provider in the amount of time listed above, if you get any new symptoms, or if there is anything that worries you.    We recommend that you get an MRI of your low back.  We were unable to get this today because of your device.  We recommend you talk to your primary care doctor and attempt to coordinate getting an MRI if able this upcoming week.  Return to the emergency department immediately for any numbness in your thighs, genitals, buttocks, or difficulty with your bowel function or with urinating.  Return for fevers or worsened pain.

## 2023-02-15 ENCOUNTER — APPOINTMENT (OUTPATIENT)
Dept: URBAN - METROPOLITAN AREA CLINIC 256 | Age: 58
Setting detail: DERMATOLOGY
End: 2023-02-16

## 2023-02-15 VITALS — WEIGHT: 240 LBS | HEIGHT: 76 IN

## 2023-02-15 DIAGNOSIS — B07.8 OTHER VIRAL WARTS: ICD-10-CM

## 2023-02-15 DIAGNOSIS — Z71.89 OTHER SPECIFIED COUNSELING: ICD-10-CM

## 2023-02-15 DIAGNOSIS — D22 MELANOCYTIC NEVI: ICD-10-CM

## 2023-02-15 DIAGNOSIS — M72.1 KNUCKLE PADS: ICD-10-CM

## 2023-02-15 DIAGNOSIS — L57.0 ACTINIC KERATOSIS: ICD-10-CM

## 2023-02-15 DIAGNOSIS — L57.8 OTHER SKIN CHANGES DUE TO CHRONIC EXPOSURE TO NONIONIZING RADIATION: ICD-10-CM

## 2023-02-15 DIAGNOSIS — L82.1 OTHER SEBORRHEIC KERATOSIS: ICD-10-CM

## 2023-02-15 PROBLEM — D22.5 MELANOCYTIC NEVI OF TRUNK: Status: ACTIVE | Noted: 2023-02-15

## 2023-02-15 PROCEDURE — OTHER EDUCATIONAL RESOURCES PROVIDED: OTHER

## 2023-02-15 PROCEDURE — OTHER LIQUID NITROGEN: OTHER

## 2023-02-15 PROCEDURE — OTHER MIPS QUALITY: OTHER

## 2023-02-15 PROCEDURE — 17000 DESTRUCT PREMALG LESION: CPT

## 2023-02-15 PROCEDURE — 99203 OFFICE O/P NEW LOW 30 MIN: CPT | Mod: 25

## 2023-02-15 PROCEDURE — OTHER COUNSELING: OTHER

## 2023-02-15 ASSESSMENT — LOCATION DETAILED DESCRIPTION DERM
LOCATION DETAILED: RIGHT ULNAR DORSAL HAND
LOCATION DETAILED: LEFT MIDDLE PROXIMAL INTERPHALANGEAL JOINT
LOCATION DETAILED: RIGHT INFERIOR UPPER BACK
LOCATION DETAILED: RIGHT INFERIOR MEDIAL MIDBACK
LOCATION DETAILED: LEFT CENTRAL MALAR CHEEK
LOCATION DETAILED: NASAL ROOT
LOCATION DETAILED: LEFT PROXIMAL DORSAL RING FINGER

## 2023-02-15 ASSESSMENT — LOCATION ZONE DERM
LOCATION ZONE: TRUNK
LOCATION ZONE: HAND
LOCATION ZONE: FACE
LOCATION ZONE: FINGER
LOCATION ZONE: NOSE

## 2023-02-15 ASSESSMENT — LOCATION SIMPLE DESCRIPTION DERM
LOCATION SIMPLE: LEFT CHEEK
LOCATION SIMPLE: NOSE
LOCATION SIMPLE: LEFT MIDDLE FINGER
LOCATION SIMPLE: RIGHT HAND
LOCATION SIMPLE: RIGHT UPPER BACK
LOCATION SIMPLE: RIGHT LOWER BACK
LOCATION SIMPLE: LEFT RING FINGER

## 2023-02-15 NOTE — PROCEDURE: LIQUID NITROGEN
Application Tool (Optional): Liquid Nitrogen Sprayer
Detail Level: Detailed
Render Note In Bullet Format When Appropriate: No
Consent: The patient's consent was obtained including but not limited to risks of crusting, scabbing, blistering, scarring, darker or lighter pigmentary change, recurrence, incomplete removal and infection.
Number Of Freeze-Thaw Cycles: 1 freeze-thaw cycle
Duration Of Freeze Thaw-Cycle (Seconds): 5
Show Aperture Variable?: Yes
Post-Care Instructions: I reviewed with the patient in detail post-care instructions. Patient is to wear sunprotection, and avoid picking at any of the treated lesions. Pt may apply Vaseline to crusted or scabbing areas.

## 2023-02-15 NOTE — HPI: PREVENTATIVE SKIN CHECK
What Is The Reason For Today's Visit?: Full Body Skin Examination
Additional History: He has no areas of concern today and is here for a routine skin check.

## 2023-04-22 ENCOUNTER — HEALTH MAINTENANCE LETTER (OUTPATIENT)
Age: 58
End: 2023-04-22

## 2023-06-19 NOTE — PLAN OF CARE
Problem: Patient Care Overview  Goal: Plan of Care/Patient Progress Review  Outcome: Improving  A&O x4. CMS intact. Bowel sounds audible and active x4, + flatus, no BM yet. VSS. Dressing reinforced this shift because of large amount of dried drainage. YUDELKA put out 10 mL this shift. Up with SBA and gait belt. C/o 4-7/10 neck pain, decreased with prn oxycodone and valium as well as scheduled analgesics. Plan to increase activity as tolerated, discharge home with assistance pending.       [see HPI] : see HPI

## 2023-08-17 ENCOUNTER — APPOINTMENT (OUTPATIENT)
Dept: URBAN - METROPOLITAN AREA CLINIC 256 | Age: 58
Setting detail: DERMATOLOGY
End: 2023-08-17

## 2023-08-17 VITALS — HEIGHT: 76 IN | WEIGHT: 245 LBS

## 2023-08-17 DIAGNOSIS — L57.8 OTHER SKIN CHANGES DUE TO CHRONIC EXPOSURE TO NONIONIZING RADIATION: ICD-10-CM

## 2023-08-17 DIAGNOSIS — R20.2 PARESTHESIA OF SKIN: ICD-10-CM

## 2023-08-17 DIAGNOSIS — D18.0 HEMANGIOMA: ICD-10-CM

## 2023-08-17 DIAGNOSIS — Z71.89 OTHER SPECIFIED COUNSELING: ICD-10-CM

## 2023-08-17 DIAGNOSIS — L82.1 OTHER SEBORRHEIC KERATOSIS: ICD-10-CM

## 2023-08-17 DIAGNOSIS — L82.0 INFLAMED SEBORRHEIC KERATOSIS: ICD-10-CM

## 2023-08-17 DIAGNOSIS — D22 MELANOCYTIC NEVI: ICD-10-CM

## 2023-08-17 PROBLEM — D22.62 MELANOCYTIC NEVI OF LEFT UPPER LIMB, INCLUDING SHOULDER: Status: ACTIVE | Noted: 2023-08-17

## 2023-08-17 PROBLEM — D22.39 MELANOCYTIC NEVI OF OTHER PARTS OF FACE: Status: ACTIVE | Noted: 2023-08-17

## 2023-08-17 PROBLEM — D22.72 MELANOCYTIC NEVI OF LEFT LOWER LIMB, INCLUDING HIP: Status: ACTIVE | Noted: 2023-08-17

## 2023-08-17 PROBLEM — D22.61 MELANOCYTIC NEVI OF RIGHT UPPER LIMB, INCLUDING SHOULDER: Status: ACTIVE | Noted: 2023-08-17

## 2023-08-17 PROBLEM — D22.71 MELANOCYTIC NEVI OF RIGHT LOWER LIMB, INCLUDING HIP: Status: ACTIVE | Noted: 2023-08-17

## 2023-08-17 PROBLEM — D22.5 MELANOCYTIC NEVI OF TRUNK: Status: ACTIVE | Noted: 2023-08-17

## 2023-08-17 PROBLEM — D18.01 HEMANGIOMA OF SKIN AND SUBCUTANEOUS TISSUE: Status: ACTIVE | Noted: 2023-08-17

## 2023-08-17 PROCEDURE — OTHER MIPS QUALITY: OTHER

## 2023-08-17 PROCEDURE — 99213 OFFICE O/P EST LOW 20 MIN: CPT | Mod: 25

## 2023-08-17 PROCEDURE — 17110 DESTRUCT B9 LESION 1-14: CPT

## 2023-08-17 PROCEDURE — OTHER ADDITIONAL NOTES: OTHER

## 2023-08-17 PROCEDURE — OTHER LIQUID NITROGEN: OTHER

## 2023-08-17 PROCEDURE — OTHER COUNSELING: OTHER

## 2023-08-17 ASSESSMENT — LOCATION SIMPLE DESCRIPTION DERM
LOCATION SIMPLE: POSTERIOR NECK
LOCATION SIMPLE: LEFT THIGH
LOCATION SIMPLE: LEFT POSTERIOR THIGH
LOCATION SIMPLE: RIGHT UPPER ARM
LOCATION SIMPLE: LEFT LOWER BACK
LOCATION SIMPLE: LEFT CHEEK
LOCATION SIMPLE: ABDOMEN
LOCATION SIMPLE: SCALP
LOCATION SIMPLE: LEFT FOREARM
LOCATION SIMPLE: RIGHT THIGH
LOCATION SIMPLE: LEFT UPPER ARM
LOCATION SIMPLE: RIGHT FOREARM
LOCATION SIMPLE: RIGHT POSTERIOR THIGH

## 2023-08-17 ASSESSMENT — LOCATION DETAILED DESCRIPTION DERM
LOCATION DETAILED: LEFT DISTAL POSTERIOR THIGH
LOCATION DETAILED: LEFT INFERIOR CENTRAL MALAR CHEEK
LOCATION DETAILED: LEFT MEDIAL TRAPEZIAL NECK
LOCATION DETAILED: LEFT LATERAL ABDOMEN
LOCATION DETAILED: RIGHT DISTAL POSTERIOR UPPER ARM
LOCATION DETAILED: RIGHT DISTAL POSTERIOR THIGH
LOCATION DETAILED: LEFT INFERIOR MEDIAL MIDBACK
LOCATION DETAILED: RIGHT ANTERIOR DISTAL THIGH
LOCATION DETAILED: LEFT ANTERIOR PROXIMAL THIGH
LOCATION DETAILED: LEFT CENTRAL PARIETAL SCALP
LOCATION DETAILED: LEFT DISTAL POSTERIOR UPPER ARM
LOCATION DETAILED: LEFT VENTRAL PROXIMAL FOREARM
LOCATION DETAILED: RIGHT VENTRAL DISTAL FOREARM

## 2023-08-17 ASSESSMENT — LOCATION ZONE DERM
LOCATION ZONE: FACE
LOCATION ZONE: LEG
LOCATION ZONE: SCALP
LOCATION ZONE: NECK
LOCATION ZONE: ARM
LOCATION ZONE: TRUNK

## 2023-08-17 NOTE — PROCEDURE: ADDITIONAL NOTES
Detail Level: Simple
Additional Notes: Patient had previous neck surgery. This is likely the cause of the nerve related itch he is experiencing. Patient educated about how the itch sensation originates from ‘the inside’.
Render Risk Assessment In Note?: no

## 2023-08-17 NOTE — HPI: FULL BODY SKIN EXAMINATION
What Is The Reason For Today's Visit?: Full Body Skin Examination
What Is The Reason For Today's Visit? (Being Monitored For X): concerning skin lesions on an annual basis
Additional History: Patient has lesion on scalp that he specifically would like evaluated today.

## 2023-08-17 NOTE — PROCEDURE: LIQUID NITROGEN
Consent: The patient's consent was obtained including but not limited to risks of crusting, scabbing, blistering, scarring, darker or lighter pigmentary change, recurrence, incomplete removal and infection.
Spray Paint Text: The liquid nitrogen was applied to the skin utilizing a spray paint frosting technique.
Add 52 Modifier (Optional): no
Show Aperture Variable?: Yes
Medical Necessity Information: It is in your best interest to select a reason for this procedure from the list below. All of these items fulfill various CMS LCD requirements except the new and changing color options.
Medical Necessity Clause: This procedure was medically necessary because the lesions that were treated were:
Detail Level: Detailed
Post-Care Instructions: I reviewed with the patient in detail post-care instructions. Patient is to wear sunprotection, and avoid picking at any of the treated lesions. Pt may apply Vaseline to crusted or scabbing areas.

## 2024-06-29 ENCOUNTER — HEALTH MAINTENANCE LETTER (OUTPATIENT)
Age: 59
End: 2024-06-29

## 2024-07-22 ENCOUNTER — APPOINTMENT (OUTPATIENT)
Dept: URBAN - METROPOLITAN AREA CLINIC 256 | Age: 59
Setting detail: DERMATOLOGY
End: 2024-07-22

## 2024-07-22 VITALS — WEIGHT: 240 LBS | HEIGHT: 76 IN

## 2024-07-22 DIAGNOSIS — Z71.89 OTHER SPECIFIED COUNSELING: ICD-10-CM

## 2024-07-22 DIAGNOSIS — L57.0 ACTINIC KERATOSIS: ICD-10-CM

## 2024-07-22 DIAGNOSIS — L57.8 OTHER SKIN CHANGES DUE TO CHRONIC EXPOSURE TO NONIONIZING RADIATION: ICD-10-CM

## 2024-07-22 DIAGNOSIS — D22 MELANOCYTIC NEVI: ICD-10-CM

## 2024-07-22 DIAGNOSIS — L82.1 OTHER SEBORRHEIC KERATOSIS: ICD-10-CM

## 2024-07-22 DIAGNOSIS — D18.0 HEMANGIOMA: ICD-10-CM

## 2024-07-22 DIAGNOSIS — R20.2 PARESTHESIA OF SKIN: ICD-10-CM

## 2024-07-22 PROBLEM — D22.72 MELANOCYTIC NEVI OF LEFT LOWER LIMB, INCLUDING HIP: Status: ACTIVE | Noted: 2024-07-22

## 2024-07-22 PROBLEM — D22.61 MELANOCYTIC NEVI OF RIGHT UPPER LIMB, INCLUDING SHOULDER: Status: ACTIVE | Noted: 2024-07-22

## 2024-07-22 PROBLEM — D22.71 MELANOCYTIC NEVI OF RIGHT LOWER LIMB, INCLUDING HIP: Status: ACTIVE | Noted: 2024-07-22

## 2024-07-22 PROBLEM — D22.39 MELANOCYTIC NEVI OF OTHER PARTS OF FACE: Status: ACTIVE | Noted: 2024-07-22

## 2024-07-22 PROBLEM — D18.01 HEMANGIOMA OF SKIN AND SUBCUTANEOUS TISSUE: Status: ACTIVE | Noted: 2024-07-22

## 2024-07-22 PROBLEM — D22.62 MELANOCYTIC NEVI OF LEFT UPPER LIMB, INCLUDING SHOULDER: Status: ACTIVE | Noted: 2024-07-22

## 2024-07-22 PROBLEM — D22.5 MELANOCYTIC NEVI OF TRUNK: Status: ACTIVE | Noted: 2024-07-22

## 2024-07-22 PROCEDURE — OTHER MIPS QUALITY: OTHER

## 2024-07-22 PROCEDURE — OTHER LIQUID NITROGEN: OTHER

## 2024-07-22 PROCEDURE — OTHER COUNSELING: OTHER

## 2024-07-22 PROCEDURE — 99213 OFFICE O/P EST LOW 20 MIN: CPT | Mod: 25

## 2024-07-22 PROCEDURE — 17000 DESTRUCT PREMALG LESION: CPT

## 2024-07-22 ASSESSMENT — LOCATION ZONE DERM
LOCATION ZONE: FACE
LOCATION ZONE: SCALP
LOCATION ZONE: LEG
LOCATION ZONE: ARM
LOCATION ZONE: NOSE
LOCATION ZONE: TRUNK

## 2024-07-22 ASSESSMENT — LOCATION DETAILED DESCRIPTION DERM
LOCATION DETAILED: LEFT VENTRAL PROXIMAL FOREARM
LOCATION DETAILED: LEFT INFERIOR MEDIAL MIDBACK
LOCATION DETAILED: LEFT SUPERIOR MEDIAL UPPER BACK
LOCATION DETAILED: LEFT MEDIAL FRONTAL SCALP
LOCATION DETAILED: NASAL SUPRATIP
LOCATION DETAILED: LEFT DISTAL POSTERIOR UPPER ARM
LOCATION DETAILED: LEFT DISTAL POSTERIOR THIGH
LOCATION DETAILED: RIGHT VENTRAL DISTAL FOREARM
LOCATION DETAILED: LEFT INFERIOR CENTRAL MALAR CHEEK
LOCATION DETAILED: RIGHT DISTAL POSTERIOR UPPER ARM
LOCATION DETAILED: RIGHT DISTAL POSTERIOR THIGH
LOCATION DETAILED: RIGHT ANTERIOR DISTAL THIGH
LOCATION DETAILED: LEFT CENTRAL MALAR CHEEK
LOCATION DETAILED: LEFT LATERAL ABDOMEN
LOCATION DETAILED: LEFT ANTERIOR PROXIMAL THIGH

## 2024-07-22 ASSESSMENT — LOCATION SIMPLE DESCRIPTION DERM
LOCATION SIMPLE: LEFT SCALP
LOCATION SIMPLE: RIGHT THIGH
LOCATION SIMPLE: RIGHT FOREARM
LOCATION SIMPLE: LEFT UPPER BACK
LOCATION SIMPLE: ABDOMEN
LOCATION SIMPLE: LEFT CHEEK
LOCATION SIMPLE: LEFT LOWER BACK
LOCATION SIMPLE: LEFT FOREARM
LOCATION SIMPLE: RIGHT UPPER ARM
LOCATION SIMPLE: NOSE
LOCATION SIMPLE: RIGHT POSTERIOR THIGH
LOCATION SIMPLE: LEFT POSTERIOR THIGH
LOCATION SIMPLE: LEFT THIGH
LOCATION SIMPLE: LEFT UPPER ARM

## 2024-07-22 NOTE — HPI: FULL BODY SKIN EXAMINATION
What Is The Reason For Today's Visit?: Full Body Skin Examination
What Is The Reason For Today's Visit? (Being Monitored For X): concerning skin lesions on an annual basis
Additional History: Spot on temple left \\nJust came back from vacation

## 2024-07-22 NOTE — PROCEDURE: LIQUID NITROGEN
Show Aperture Variable?: Yes
Render Note In Bullet Format When Appropriate: No
Duration Of Freeze Thaw-Cycle (Seconds): 3
Post-Care Instructions: I reviewed with the patient in detail post-care instructions. Patient is to wear sunprotection, and avoid picking at any of the treated lesions. Pt may apply Vaseline to crusted or scabbing areas.
Number Of Freeze-Thaw Cycles: 1 freeze-thaw cycle
Detail Level: Simple
Consent: The patient's verbal consent was obtained including but not limited to risks of crusting, scabbing, blistering, scarring, darker or lighter pigmentary change, recurrence, incomplete removal and infection.

## 2025-06-13 ENCOUNTER — APPOINTMENT (OUTPATIENT)
Dept: URBAN - METROPOLITAN AREA CLINIC 256 | Age: 60
Setting detail: DERMATOLOGY
End: 2025-06-13

## 2025-06-13 VITALS — HEIGHT: 74 IN | WEIGHT: 240 LBS

## 2025-06-13 DIAGNOSIS — L57.0 ACTINIC KERATOSIS: ICD-10-CM

## 2025-06-13 DIAGNOSIS — L57.8 OTHER SKIN CHANGES DUE TO CHRONIC EXPOSURE TO NONIONIZING RADIATION: ICD-10-CM

## 2025-06-13 DIAGNOSIS — Z71.89 OTHER SPECIFIED COUNSELING: ICD-10-CM

## 2025-06-13 DIAGNOSIS — L82.0 INFLAMED SEBORRHEIC KERATOSIS: ICD-10-CM

## 2025-06-13 DIAGNOSIS — L82.1 OTHER SEBORRHEIC KERATOSIS: ICD-10-CM

## 2025-06-13 DIAGNOSIS — D22 MELANOCYTIC NEVI: ICD-10-CM

## 2025-06-13 DIAGNOSIS — D18.0 HEMANGIOMA: ICD-10-CM

## 2025-06-13 PROBLEM — D22.72 MELANOCYTIC NEVI OF LEFT LOWER LIMB, INCLUDING HIP: Status: ACTIVE | Noted: 2025-06-13

## 2025-06-13 PROBLEM — D22.62 MELANOCYTIC NEVI OF LEFT UPPER LIMB, INCLUDING SHOULDER: Status: ACTIVE | Noted: 2025-06-13

## 2025-06-13 PROBLEM — D22.71 MELANOCYTIC NEVI OF RIGHT LOWER LIMB, INCLUDING HIP: Status: ACTIVE | Noted: 2025-06-13

## 2025-06-13 PROBLEM — D18.01 HEMANGIOMA OF SKIN AND SUBCUTANEOUS TISSUE: Status: ACTIVE | Noted: 2025-06-13

## 2025-06-13 PROBLEM — D22.5 MELANOCYTIC NEVI OF TRUNK: Status: ACTIVE | Noted: 2025-06-13

## 2025-06-13 PROBLEM — D22.61 MELANOCYTIC NEVI OF RIGHT UPPER LIMB, INCLUDING SHOULDER: Status: ACTIVE | Noted: 2025-06-13

## 2025-06-13 PROCEDURE — 17110 DESTRUCT B9 LESION 1-14: CPT

## 2025-06-13 PROCEDURE — OTHER MIPS QUALITY: OTHER

## 2025-06-13 PROCEDURE — 99213 OFFICE O/P EST LOW 20 MIN: CPT | Mod: 25

## 2025-06-13 PROCEDURE — OTHER SUNSCREEN RECOMMENDATIONS: OTHER

## 2025-06-13 PROCEDURE — 17003 DESTRUCT PREMALG LES 2-14: CPT | Mod: 59

## 2025-06-13 PROCEDURE — OTHER LIQUID NITROGEN: OTHER

## 2025-06-13 PROCEDURE — 17000 DESTRUCT PREMALG LESION: CPT | Mod: 59

## 2025-06-13 PROCEDURE — OTHER PATIENT SPECIFIC COUNSELING: OTHER

## 2025-06-13 PROCEDURE — OTHER COUNSELING: OTHER

## 2025-06-13 ASSESSMENT — LOCATION DETAILED DESCRIPTION DERM
LOCATION DETAILED: INFERIOR THORACIC SPINE
LOCATION DETAILED: MIDDLE STERNUM
LOCATION DETAILED: RIGHT ANTERIOR DISTAL THIGH
LOCATION DETAILED: RIGHT VENTRAL PROXIMAL FOREARM
LOCATION DETAILED: LEFT INFERIOR CENTRAL MALAR CHEEK
LOCATION DETAILED: RIGHT ANTERIOR PROXIMAL THIGH
LOCATION DETAILED: LEFT ANTERIOR DISTAL THIGH
LOCATION DETAILED: LEFT CENTRAL ZYGOMA
LOCATION DETAILED: RIGHT PROXIMAL DORSAL FOREARM
LOCATION DETAILED: LEFT VENTRAL DISTAL FOREARM
LOCATION DETAILED: RIGHT VENTRAL DISTAL FOREARM
LOCATION DETAILED: LEFT INFERIOR LATERAL MIDBACK
LOCATION DETAILED: EPIGASTRIC SKIN
LOCATION DETAILED: LEFT ANTECUBITAL SKIN
LOCATION DETAILED: LEFT VENTRAL PROXIMAL FOREARM
LOCATION DETAILED: RIGHT SUPERIOR FOREHEAD
LOCATION DETAILED: LEFT ANTERIOR PROXIMAL THIGH
LOCATION DETAILED: LEFT MEDIAL UPPER BACK
LOCATION DETAILED: LEFT DISTAL DORSAL FOREARM
LOCATION DETAILED: LEFT CENTRAL TEMPLE

## 2025-06-13 ASSESSMENT — LOCATION SIMPLE DESCRIPTION DERM
LOCATION SIMPLE: LEFT CHEEK
LOCATION SIMPLE: RIGHT FOREHEAD
LOCATION SIMPLE: UPPER BACK
LOCATION SIMPLE: LEFT TEMPLE
LOCATION SIMPLE: RIGHT THIGH
LOCATION SIMPLE: LEFT THIGH
LOCATION SIMPLE: LEFT LOWER BACK
LOCATION SIMPLE: LEFT ZYGOMA
LOCATION SIMPLE: LEFT UPPER ARM
LOCATION SIMPLE: RIGHT FOREARM
LOCATION SIMPLE: LEFT FOREARM
LOCATION SIMPLE: CHEST
LOCATION SIMPLE: ABDOMEN
LOCATION SIMPLE: LEFT UPPER BACK

## 2025-06-13 ASSESSMENT — LOCATION ZONE DERM
LOCATION ZONE: FACE
LOCATION ZONE: ARM
LOCATION ZONE: TRUNK
LOCATION ZONE: LEG

## 2025-06-24 ENCOUNTER — APPOINTMENT (OUTPATIENT)
Dept: URBAN - METROPOLITAN AREA CLINIC 256 | Age: 60
Setting detail: DERMATOLOGY
End: 2025-06-24

## 2025-06-24 VITALS — HEIGHT: 74 IN | WEIGHT: 240 LBS

## 2025-06-24 DIAGNOSIS — L82.0 INFLAMED SEBORRHEIC KERATOSIS: ICD-10-CM

## 2025-06-24 DIAGNOSIS — L57.0 ACTINIC KERATOSIS: ICD-10-CM

## 2025-06-24 DIAGNOSIS — L82.1 OTHER SEBORRHEIC KERATOSIS: ICD-10-CM

## 2025-06-24 PROCEDURE — 17000 DESTRUCT PREMALG LESION: CPT | Mod: 59

## 2025-06-24 PROCEDURE — OTHER MIPS QUALITY: OTHER

## 2025-06-24 PROCEDURE — OTHER LIQUID NITROGEN: OTHER

## 2025-06-24 PROCEDURE — 99212 OFFICE O/P EST SF 10 MIN: CPT | Mod: 25

## 2025-06-24 PROCEDURE — 17111 DESTRUCT LESION 15 OR MORE: CPT

## 2025-06-24 PROCEDURE — OTHER COUNSELING: OTHER

## 2025-06-24 ASSESSMENT — LOCATION DETAILED DESCRIPTION DERM
LOCATION DETAILED: LEFT PROXIMAL ULNAR DORSAL FOREARM
LOCATION DETAILED: LEFT CLAVICULAR NECK
LOCATION DETAILED: LEFT CENTRAL TEMPLE
LOCATION DETAILED: RIGHT MEDIAL FOREHEAD
LOCATION DETAILED: LEFT RADIAL DORSAL HAND
LOCATION DETAILED: LEFT MEDIAL INFERIOR CHEST
LOCATION DETAILED: LEFT DISTAL DORSAL FOREARM

## 2025-06-24 ASSESSMENT — LOCATION SIMPLE DESCRIPTION DERM
LOCATION SIMPLE: CHEST
LOCATION SIMPLE: LEFT FOREARM
LOCATION SIMPLE: RIGHT FOREHEAD
LOCATION SIMPLE: LEFT ANTERIOR NECK
LOCATION SIMPLE: LEFT HAND
LOCATION SIMPLE: LEFT TEMPLE

## 2025-06-24 ASSESSMENT — LOCATION ZONE DERM
LOCATION ZONE: TRUNK
LOCATION ZONE: ARM
LOCATION ZONE: NECK
LOCATION ZONE: HAND
LOCATION ZONE: FACE

## 2025-07-13 ENCOUNTER — HEALTH MAINTENANCE LETTER (OUTPATIENT)
Age: 60
End: 2025-07-13

## (undated) DEVICE — SPONGE KITTNER 31001010

## (undated) DEVICE — PAD POSITIONING KIT HIP DISP 5844-17

## (undated) DEVICE — Device

## (undated) DEVICE — DRAIN JACKSON PRATT 10FR ROUND SU130-1321

## (undated) DEVICE — DRAIN JACKSON PRATT RESERVOIR 100ML SU130-1305

## (undated) DEVICE — PIN DISTRACTION ANCHOR FOR SCR 14MM MDS9091414

## (undated) DEVICE — SPONGE BALL KERLIX ROUND XL W/O STRING LATEX 4935

## (undated) DEVICE — SUCTION FRAZIER 12FR W/HANDLE K73

## (undated) DEVICE — SUCTION CANISTER MEDIVAC LINER 3000ML W/LID 65651-530

## (undated) DEVICE — SU VICRYL 2-0 CP-2 27" UND J869H

## (undated) DEVICE — SU SILK 3-0 TIE 24" SA74H

## (undated) DEVICE — SOL NACL 0.9% IRRIG 1000ML BOTTLE 07138-09

## (undated) DEVICE — DRAPE OVERHEAD TABLE

## (undated) DEVICE — IOM MONITORING UP TO 7 HOURS

## (undated) DEVICE — ESU GROUND PAD UNIVERSAL W/O CORD

## (undated) DEVICE — PACK UNIVERSAL SPLIT 29131

## (undated) DEVICE — SU VICRYL 3-0 PS-1 18" UND J683

## (undated) DEVICE — SPONGE SURGIFOAM 100 1974

## (undated) DEVICE — DRSG STERI STRIP 1/2X4" R1547

## (undated) DEVICE — GLOVE PROTEXIS W/NEU-THERA 7.0  2D73TE70

## (undated) DEVICE — SU VICRYL 1 CT-1 27" J341H

## (undated) DEVICE — LINEN TOWEL PACK X5 5464

## (undated) DEVICE — ESU ELEC BLADE 2.75" COATED/INSULATED E1455

## (undated) DEVICE — PREP CHLORAPREP 26ML TINTED ORANGE  260815

## (undated) DEVICE — SOL WATER IRRIG 1000ML BOTTLE 2F7114

## (undated) DEVICE — CUSHION INSERT LG PRONE VIEW JACKSON TABLE

## (undated) DEVICE — IMM COLLAR CERVICAL MED UNIVERSAL 3X24" 79-83500

## (undated) DEVICE — GLOVE PROTEXIS W/NEU-THERA 8.5  2D73TE85

## (undated) DEVICE — SPONGE RAY-TEC 4X8" 7318

## (undated) DEVICE — GOWN XXL 9575

## (undated) DEVICE — DRAPE IOBAN INCISE 23X17" 6650EZ

## (undated) DEVICE — DRAPE SHEET REV FOLD 3/4 9349

## (undated) DEVICE — NDL SPINAL 22GA 3.5" QUINCKE 405181

## (undated) DEVICE — CATH TRAY FOLEY COUDE SURESTEP 16FR W/URNE MTR STLK A304716A

## (undated) DEVICE — GLOVE PROTEXIS W/NEU-THERA 6.5  2D73TE65

## (undated) DEVICE — DRAPE C-ARM 60X42" 1013

## (undated) DEVICE — GLOVE PROTEXIS BLUE W/NEU-THERA 7.0  2D73EB70

## (undated) DEVICE — GLOVE PROTEXIS MICRO 8.5  2D73PM85

## (undated) DEVICE — TAPE DRSG UNIVERSAL CLOTH 3" WHITE LATEX 881-3

## (undated) DEVICE — DRSG GAUZE 4X4" 3033

## (undated) DEVICE — PACK SPINE SM CUSTOM SNE15SSFSK

## (undated) DEVICE — BLADE CLIPPER 4412A

## (undated) DEVICE — DRAPE STERI TOWEL SM 1000

## (undated) DEVICE — STPL SKIN PROXIMATE 35 WIDE PMW35

## (undated) DEVICE — SU VICRYL 3-0 PS-2 18" UND J497G

## (undated) DEVICE — PAD POSITIONING KIT CHEST SHEARGUARD DISP LF 5844-13

## (undated) DEVICE — GLOVE PROTEXIS BLUE W/NEU-THERA 8.5  2D73EB85

## (undated) DEVICE — WIPES FOLEY CARE SURESTEP PROVON DFC100

## (undated) RX ORDER — REMIFENTANIL HYDROCHLORIDE 1 MG/ML
INJECTION, POWDER, LYOPHILIZED, FOR SOLUTION INTRAVENOUS
Status: DISPENSED
Start: 2018-09-18

## (undated) RX ORDER — FENTANYL CITRATE 50 UG/ML
INJECTION, SOLUTION INTRAMUSCULAR; INTRAVENOUS
Status: DISPENSED
Start: 2018-09-18

## (undated) RX ORDER — POVIDONE-IODINE 10 MG/G
OINTMENT TOPICAL
Status: DISPENSED
Start: 2018-09-18

## (undated) RX ORDER — PROPOFOL 10 MG/ML
INJECTION, EMULSION INTRAVENOUS
Status: DISPENSED
Start: 2018-09-18

## (undated) RX ORDER — HYDROMORPHONE HYDROCHLORIDE 1 MG/ML
INJECTION, SOLUTION INTRAMUSCULAR; INTRAVENOUS; SUBCUTANEOUS
Status: DISPENSED
Start: 2018-09-18

## (undated) RX ORDER — EPINEPHRINE 1 MG/ML
INJECTION, SOLUTION INTRAMUSCULAR; SUBCUTANEOUS
Status: DISPENSED
Start: 2018-09-18

## (undated) RX ORDER — BUPIVACAINE HYDROCHLORIDE AND EPINEPHRINE 2.5; 5 MG/ML; UG/ML
INJECTION, SOLUTION EPIDURAL; INFILTRATION; INTRACAUDAL; PERINEURAL
Status: DISPENSED
Start: 2018-09-18

## (undated) RX ORDER — ACETAMINOPHEN 500 MG
TABLET ORAL
Status: DISPENSED
Start: 2018-09-18

## (undated) RX ORDER — BUPIVACAINE HYDROCHLORIDE 2.5 MG/ML
INJECTION, SOLUTION EPIDURAL; INFILTRATION; INTRACAUDAL
Status: DISPENSED
Start: 2018-09-18

## (undated) RX ORDER — OXYCODONE HCL 10 MG/1
TABLET, FILM COATED, EXTENDED RELEASE ORAL
Status: DISPENSED
Start: 2018-09-18

## (undated) RX ORDER — BETAMETHASONE SODIUM PHOSPHATE AND BETAMETHASONE ACETATE 3; 3 MG/ML; MG/ML
INJECTION, SUSPENSION INTRA-ARTICULAR; INTRALESIONAL; INTRAMUSCULAR; SOFT TISSUE
Status: DISPENSED
Start: 2018-09-18

## (undated) RX ORDER — HYDRALAZINE HYDROCHLORIDE 20 MG/ML
INJECTION INTRAMUSCULAR; INTRAVENOUS
Status: DISPENSED
Start: 2018-09-18

## (undated) RX ORDER — CEFAZOLIN SODIUM 1 G/3ML
INJECTION, POWDER, FOR SOLUTION INTRAMUSCULAR; INTRAVENOUS
Status: DISPENSED
Start: 2018-09-18